# Patient Record
Sex: FEMALE | Race: ASIAN | NOT HISPANIC OR LATINO | Employment: FULL TIME | URBAN - METROPOLITAN AREA
[De-identification: names, ages, dates, MRNs, and addresses within clinical notes are randomized per-mention and may not be internally consistent; named-entity substitution may affect disease eponyms.]

---

## 2020-02-17 ENCOUNTER — TELEPHONE (OUTPATIENT)
Dept: OBSTETRICS AND GYNECOLOGY | Facility: CLINIC | Age: 45
End: 2020-02-17

## 2020-02-17 DIAGNOSIS — N92.3 INTERMENSTRUAL BLEEDING: ICD-10-CM

## 2020-02-17 DIAGNOSIS — Z30.41 ENCOUNTER FOR SURVEILLANCE OF CONTRACEPTIVE PILLS: Primary | ICD-10-CM

## 2020-02-17 NOTE — TELEPHONE ENCOUNTER
Pt c/o BTB with Microgestin.  She has been on this pill for 4 months.  She takes the pill at the same time every day, has not missed a pill and allows monthly periods.  She reports BTB midcycle and also has a period during placebo week.  This is the first pill she has been on recently.      Allergies and pharmacy UTD

## 2020-02-17 NOTE — TELEPHONE ENCOUNTER
Pt states that she experiencing breakthrough bleeding with taking Microgestin. Pt states that she has been taking it for 4 months and wants to see if there is something else she can take.

## 2020-02-18 RX ORDER — NORGESTIMATE AND ETHINYL ESTRADIOL 0.25-0.035
1 KIT ORAL DAILY
Qty: 90 TABLET | Refills: 3 | Status: SHIPPED | OUTPATIENT
Start: 2020-02-18 | End: 2020-11-02

## 2020-02-18 NOTE — TELEPHONE ENCOUNTER
Will change OCP to Ortho-cyclen to see if can control breakthrough bleeding and acne better. Rx sent to pharmacy. Patient needs to schedule followup in 3 months after started new OCP. Would recommend to finish current pill pack before switching to new one.

## 2020-02-18 NOTE — TELEPHONE ENCOUNTER
Pt notified; has 2 weeks left of current pills; offered to schedule appt now but declined; pt stated she will call back to schedule f/u appt for 3 months.

## 2020-07-29 ENCOUNTER — OFFICE VISIT (OUTPATIENT)
Dept: OPTOMETRY | Facility: CLINIC | Age: 45
End: 2020-07-29
Payer: COMMERCIAL

## 2020-07-29 DIAGNOSIS — H52.13 MYOPIA WITH PRESBYOPIA OF BOTH EYES: Primary | ICD-10-CM

## 2020-07-29 DIAGNOSIS — H52.4 MYOPIA WITH PRESBYOPIA OF BOTH EYES: Primary | ICD-10-CM

## 2020-07-29 PROCEDURE — 99999 PR PBB SHADOW E&M-EST. PATIENT-LVL II: ICD-10-PCS | Mod: PBBFAC,,, | Performed by: OPTOMETRIST

## 2020-07-29 PROCEDURE — 92004 PR EYE EXAM, NEW PATIENT,COMPREHESV: ICD-10-PCS | Mod: S$GLB,,, | Performed by: OPTOMETRIST

## 2020-07-29 PROCEDURE — 92004 COMPRE OPH EXAM NEW PT 1/>: CPT | Mod: S$GLB,,, | Performed by: OPTOMETRIST

## 2020-07-29 PROCEDURE — 92015 PR REFRACTION: ICD-10-PCS | Mod: S$GLB,,, | Performed by: OPTOMETRIST

## 2020-07-29 PROCEDURE — 99999 PR PBB SHADOW E&M-EST. PATIENT-LVL II: CPT | Mod: PBBFAC,,, | Performed by: OPTOMETRIST

## 2020-07-29 PROCEDURE — 92015 DETERMINE REFRACTIVE STATE: CPT | Mod: S$GLB,,, | Performed by: OPTOMETRIST

## 2020-07-29 NOTE — PROGRESS NOTES
HPI     WILBER: 2019  Pt states reading is getting a little worse with her glasses on. Dist is   still good   No f/f  No gtts  No sx      Last edited by Mattie Simmons MA on 7/29/2020  3:28 PM. (History)        ROS     Negative for: Constitutional, Gastrointestinal, Neurological, Skin,   Genitourinary, Musculoskeletal, HENT, Endocrine, Cardiovascular, Eyes,   Respiratory, Psychiatric, Allergic/Imm, Heme/Lymph    Last edited by Dakota Coulter, OD on 7/29/2020  3:36 PM. (History)        Assessment /Plan     For exam results, see Encounter Report.    Myopia with presbyopia of both eyes      Inc presby--wrote new spex Rx    PLAN:    rtc 1 yr

## 2020-11-02 ENCOUNTER — OFFICE VISIT (OUTPATIENT)
Dept: OBSTETRICS AND GYNECOLOGY | Facility: CLINIC | Age: 45
End: 2020-11-02
Payer: COMMERCIAL

## 2020-11-02 VITALS
BODY MASS INDEX: 18.93 KG/M2 | DIASTOLIC BLOOD PRESSURE: 85 MMHG | SYSTOLIC BLOOD PRESSURE: 110 MMHG | HEIGHT: 67 IN | WEIGHT: 120.63 LBS

## 2020-11-02 DIAGNOSIS — Z01.419 ENCOUNTER FOR ANNUAL ROUTINE GYNECOLOGICAL EXAMINATION: Primary | ICD-10-CM

## 2020-11-02 DIAGNOSIS — Z30.41 ENCOUNTER FOR SURVEILLANCE OF CONTRACEPTIVE PILLS: ICD-10-CM

## 2020-11-02 DIAGNOSIS — Z11.51 ENCOUNTER FOR SCREENING FOR HUMAN PAPILLOMAVIRUS (HPV): ICD-10-CM

## 2020-11-02 DIAGNOSIS — Z00.00 HEALTH CARE MAINTENANCE: ICD-10-CM

## 2020-11-02 DIAGNOSIS — Z12.4 ENCOUNTER FOR PAPANICOLAOU SMEAR FOR CERVICAL CANCER SCREENING: ICD-10-CM

## 2020-11-02 DIAGNOSIS — Z12.31 ENCOUNTER FOR SCREENING MAMMOGRAM FOR BREAST CANCER: ICD-10-CM

## 2020-11-02 DIAGNOSIS — Z80.41 FAMILY HISTORY OF OVARIAN CANCER: ICD-10-CM

## 2020-11-02 PROCEDURE — 3008F BODY MASS INDEX DOCD: CPT | Mod: CPTII,S$GLB,, | Performed by: OBSTETRICS & GYNECOLOGY

## 2020-11-02 PROCEDURE — 88175 CYTOPATH C/V AUTO FLUID REDO: CPT

## 2020-11-02 PROCEDURE — 87624 HPV HI-RISK TYP POOLED RSLT: CPT

## 2020-11-02 PROCEDURE — 99396 PREV VISIT EST AGE 40-64: CPT | Mod: S$GLB,,, | Performed by: OBSTETRICS & GYNECOLOGY

## 2020-11-02 PROCEDURE — 99999 PR PBB SHADOW E&M-EST. PATIENT-LVL III: CPT | Mod: PBBFAC,,, | Performed by: OBSTETRICS & GYNECOLOGY

## 2020-11-02 PROCEDURE — 3008F PR BODY MASS INDEX (BMI) DOCUMENTED: ICD-10-PCS | Mod: CPTII,S$GLB,, | Performed by: OBSTETRICS & GYNECOLOGY

## 2020-11-02 PROCEDURE — 99396 PR PREVENTIVE VISIT,EST,40-64: ICD-10-PCS | Mod: S$GLB,,, | Performed by: OBSTETRICS & GYNECOLOGY

## 2020-11-02 PROCEDURE — 99999 PR PBB SHADOW E&M-EST. PATIENT-LVL III: ICD-10-PCS | Mod: PBBFAC,,, | Performed by: OBSTETRICS & GYNECOLOGY

## 2020-11-02 RX ORDER — ACETAMINOPHEN AND CODEINE PHOSPHATE 120; 12 MG/5ML; MG/5ML
1 SOLUTION ORAL DAILY
Qty: 90 TABLET | Refills: 3 | Status: SHIPPED | OUTPATIENT
Start: 2020-11-02 | End: 2021-12-07

## 2020-11-02 NOTE — PROGRESS NOTES
Chief Complaint: Well Woman Exam   Patient known to me.  HPI:      Ricardo Vizcarra is a 45 y.o.  who presents for annual exam. She is currently complaining of breakthrough bleeding on OCP.     She has been on Microgestin  for years and had some episodes of bleeding in week 3 a few months ago. She was switched to Ortho Cyclen but stopped after one month due to headache and still having breakthrough bleeding on week 3. She stayed off for 3 months then restarted Microgestin in past 2 months from refills she already had and says breakthrough only about 1-2 days before placebo pills but still having menstrual headache for one day each cycle. She is also getting more acne with menstrual cycle. She desires to continue a pill for now. She has a history of heavy cycles with small fibroids and declined options outside of pills in the past. Records requested today from [facility].    Ms. Vizcarra is currently sexually active with a single male partner. She declines STD screening today. Patient has regular monthly menses. Patient's last menstrual period was 10/24/2020 (exact date). She is currently using oral contraceptives (estrogen/progesterone) for contraception.    Previous Pap:  no abnormalities (2020) Last done at . No records for review today.  Previous Mammogram: 2019 normal per patient. Last done at . No records for review today.    Gardasil:unsure and will check records     OB History        4    Para   2    Term   1       1    AB   2    Living   1       SAB   2    TAB        Ectopic        Multiple        Live Births   1           Obstetric Comments   Menarche at 13             ROS:     GENERAL: Denies unintentional weight gain or weight loss. Feeling well overall.   SKIN: Denies rash or lesions.   HEENT: Denies headaches, or vision changes.   CARDIOVASCULAR: Denies palpitations or chest pain.   RESPIRATORY: Denies shortness of breath or dyspnea on exertion.  BREASTS: Denies pain, lumps, or nipple  "discharge.   ABDOMEN: Denies abdominal pain, constipation, diarrhea, nausea, vomiting, or change in appetite.   URINARY: Denies frequency, dysuria, hematuria.  NEUROLOGIC: Denies syncope or weakness.   PSYCHIATRIC: Denies depression, anxiety or mood swings.    Physical Exam:      PHYSICAL EXAM:  /85   Ht 5' 6.5" (1.689 m)   Wt 54.7 kg (120 lb 9.6 oz)   LMP 10/24/2020 (Exact Date)   BMI 19.17 kg/m²   Body mass index is 19.17 kg/m².     APPEARANCE: Well nourished, well developed, in no acute distress.  PSYCH: Appropriate mood and affect.  SKIN: No acne or hirsutism  NECK: Neck symmetric without masses or thyromegaly  NODES: No inguinal, axillary, or supraclavicular lymph node enlargement  CHEST: Normal respiratory effort.  ABDOMEN: Soft.  No tenderness or masses.   BREASTS: Symmetrical, no skin changes or visible lesions.  No palpable masses or nipple discharge bilaterally.  PELVIC: Normal external genitalia without lesions.  Normal hair distribution.  Adequate perineal body, normal urethral meatus.  Vagina moist and well rugated without lesions or discharge.  Cervix pink, without lesions, discharge or tenderness.  No significant cystocele or rectocele.  Bimanual exam shows uterus to be normal size (8cm), regular, mobile and nontender.  Adnexa without masses or tenderness.    EXTREMITIES: No edema.    Assessment/Plan:     Encounter for annual routine gynecological examination  Normal exam today. Pap smear with HPV done. Mammogram ordered and screening health lab work as not yet gotten a new PCP and desires to do her own research first before choosing. Counseled on hormonal options with progesterone-only or continuous use combined hormonal contraception due to menstrual headaches and desires to try progesterone pill first. Rx sent to pharmacy. Risks/benefits reviewed.   Encounter for Papanicolaou smear for cervical cancer screening  -     Liquid-Based Pap Smear, Screening    Encounter for screening for human " papillomavirus (HPV)  -     HPV High Risk Genotypes, PCR    Encounter for screening mammogram for breast cancer  -     Mammo Digital Screening Bilat w/ Estuardo; Future; Expected date: 2020    Encounter for surveillance of contraceptive pills  -     norethindrone (ORTHO MICRONOR) 0.35 mg tablet; Take 1 tablet (0.35 mg total) by mouth once daily.  Dispense: 90 tablet; Refill: 3    Family history of ovarian cancer  Counseled on genetic testing as unsure of results of mother's testing who  over 10 years ago. Telegenetics appointment scheduled.    Health care maintenance  -     CBC Auto Differential; Future; Expected date: 2020  -     Comprehensive Metabolic Panel; Future; Expected date: 2020  -     Lipid Panel; Future; Expected date: 2020  -     TSH; Future; Expected date: 2020    RTC 1 year     Counseling:     Patient was counseled today on current ASCCP pap guidelines, the recommendation for yearly pelvic exams, healthy diet and exercise routines, breast self awareness and annual mammograms. She is to see her PCP for other health maintenance.       Use of the [employer] Patient Portal discussed and encouraged during today's visit.       Tabitha Mckenzie MD

## 2020-11-04 ENCOUNTER — TELEPHONE (OUTPATIENT)
Dept: OBSTETRICS AND GYNECOLOGY | Facility: CLINIC | Age: 45
End: 2020-11-04

## 2020-11-11 ENCOUNTER — APPOINTMENT (OUTPATIENT)
Dept: RADIOLOGY | Facility: OTHER | Age: 45
End: 2020-11-11
Attending: OBSTETRICS & GYNECOLOGY
Payer: COMMERCIAL

## 2020-11-11 VITALS — WEIGHT: 120.56 LBS | HEIGHT: 67 IN | BODY MASS INDEX: 18.92 KG/M2

## 2020-11-11 DIAGNOSIS — Z12.31 ENCOUNTER FOR SCREENING MAMMOGRAM FOR BREAST CANCER: ICD-10-CM

## 2020-11-11 LAB
HPV HR 12 DNA SPEC QL NAA+PROBE: NEGATIVE
HPV16 AG SPEC QL: NEGATIVE
HPV18 DNA SPEC QL NAA+PROBE: NEGATIVE

## 2020-11-11 PROCEDURE — 77063 BREAST TOMOSYNTHESIS BI: CPT | Mod: 26,,, | Performed by: RADIOLOGY

## 2020-11-11 PROCEDURE — 77063 MAMMO DIGITAL SCREENING BILAT WITH TOMO: ICD-10-PCS | Mod: 26,,, | Performed by: RADIOLOGY

## 2020-11-11 PROCEDURE — 77067 SCR MAMMO BI INCL CAD: CPT | Mod: TC,PN

## 2020-11-11 PROCEDURE — 77067 SCR MAMMO BI INCL CAD: CPT | Mod: 26,,, | Performed by: RADIOLOGY

## 2020-11-11 PROCEDURE — 77067 MAMMO DIGITAL SCREENING BILAT WITH TOMO: ICD-10-PCS | Mod: 26,,, | Performed by: RADIOLOGY

## 2020-12-07 LAB
FINAL PATHOLOGIC DIAGNOSIS: NORMAL
Lab: NORMAL

## 2021-10-12 ENCOUNTER — TELEPHONE (OUTPATIENT)
Dept: OBSTETRICS AND GYNECOLOGY | Facility: CLINIC | Age: 46
End: 2021-10-12

## 2021-10-12 DIAGNOSIS — D25.9 UTERINE LEIOMYOMA, UNSPECIFIED LOCATION: ICD-10-CM

## 2021-10-12 DIAGNOSIS — Z80.41 FAMILY HISTORY OF OVARIAN CANCER: Primary | ICD-10-CM

## 2021-10-15 ENCOUNTER — PATIENT MESSAGE (OUTPATIENT)
Dept: OBSTETRICS AND GYNECOLOGY | Facility: CLINIC | Age: 46
End: 2021-10-15
Payer: COMMERCIAL

## 2021-12-07 ENCOUNTER — OFFICE VISIT (OUTPATIENT)
Dept: OBSTETRICS AND GYNECOLOGY | Facility: CLINIC | Age: 46
End: 2021-12-07
Payer: COMMERCIAL

## 2021-12-07 VITALS — HEIGHT: 65 IN | WEIGHT: 132.06 LBS | BODY MASS INDEX: 22 KG/M2

## 2021-12-07 DIAGNOSIS — Z01.419 ENCOUNTER FOR ANNUAL ROUTINE GYNECOLOGICAL EXAMINATION: Primary | ICD-10-CM

## 2021-12-07 DIAGNOSIS — Z12.31 SCREENING MAMMOGRAM FOR BREAST CANCER: ICD-10-CM

## 2021-12-07 DIAGNOSIS — D25.9 UTERINE LEIOMYOMA, UNSPECIFIED LOCATION: ICD-10-CM

## 2021-12-07 DIAGNOSIS — Z80.41 FAMILY HISTORY OF OVARIAN CANCER: ICD-10-CM

## 2021-12-07 DIAGNOSIS — N94.6 DYSMENORRHEA: ICD-10-CM

## 2021-12-07 PROCEDURE — 99396 PREV VISIT EST AGE 40-64: CPT | Mod: S$GLB,,, | Performed by: OBSTETRICS & GYNECOLOGY

## 2021-12-07 PROCEDURE — 99396 PR PREVENTIVE VISIT,EST,40-64: ICD-10-PCS | Mod: S$GLB,,, | Performed by: OBSTETRICS & GYNECOLOGY

## 2021-12-07 PROCEDURE — 99999 PR PBB SHADOW E&M-EST. PATIENT-LVL III: CPT | Mod: PBBFAC,,, | Performed by: OBSTETRICS & GYNECOLOGY

## 2021-12-07 PROCEDURE — 99999 PR PBB SHADOW E&M-EST. PATIENT-LVL III: ICD-10-PCS | Mod: PBBFAC,,, | Performed by: OBSTETRICS & GYNECOLOGY

## 2021-12-07 RX ORDER — NAPROXEN 500 MG/1
500 TABLET ORAL 2 TIMES DAILY WITH MEALS
Qty: 60 TABLET | Refills: 2 | Status: SHIPPED | OUTPATIENT
Start: 2021-12-07 | End: 2022-11-15 | Stop reason: SDUPTHER

## 2021-12-08 ENCOUNTER — TELEPHONE (OUTPATIENT)
Dept: OBSTETRICS AND GYNECOLOGY | Facility: CLINIC | Age: 46
End: 2021-12-08
Payer: COMMERCIAL

## 2021-12-16 ENCOUNTER — APPOINTMENT (OUTPATIENT)
Dept: RADIOLOGY | Facility: OTHER | Age: 46
End: 2021-12-16
Attending: OBSTETRICS & GYNECOLOGY
Payer: COMMERCIAL

## 2021-12-16 VITALS — WEIGHT: 132.06 LBS | HEIGHT: 65 IN | BODY MASS INDEX: 22 KG/M2

## 2021-12-16 DIAGNOSIS — Z12.31 SCREENING MAMMOGRAM FOR BREAST CANCER: ICD-10-CM

## 2021-12-16 PROCEDURE — 77063 BREAST TOMOSYNTHESIS BI: CPT | Mod: 26,,, | Performed by: RADIOLOGY

## 2021-12-16 PROCEDURE — 77067 SCR MAMMO BI INCL CAD: CPT | Mod: 26,,, | Performed by: RADIOLOGY

## 2021-12-16 PROCEDURE — 77067 MAMMO DIGITAL SCREENING BILAT WITH TOMO: ICD-10-PCS | Mod: 26,,, | Performed by: RADIOLOGY

## 2021-12-16 PROCEDURE — 77067 SCR MAMMO BI INCL CAD: CPT | Mod: TC,PN

## 2021-12-16 PROCEDURE — 77063 MAMMO DIGITAL SCREENING BILAT WITH TOMO: ICD-10-PCS | Mod: 26,,, | Performed by: RADIOLOGY

## 2022-03-22 ENCOUNTER — TELEPHONE (OUTPATIENT)
Dept: INTERNAL MEDICINE | Facility: CLINIC | Age: 47
End: 2022-03-22
Payer: COMMERCIAL

## 2022-03-22 NOTE — TELEPHONE ENCOUNTER
----- Message from Val Diaz sent at 3/22/2022 10:53 AM CDT -----  .Type:  Patient Call Back    Who Called: pt       Does the patient know what this is regarding?: Pt received a message about coming in sooner I only see the 4/20/2022    Would the patient rather a call back yes     Best Call Back Number: [phone]    Additional Information: Thank You

## 2022-04-20 ENCOUNTER — OFFICE VISIT (OUTPATIENT)
Dept: INTERNAL MEDICINE | Facility: CLINIC | Age: 47
End: 2022-04-20
Payer: COMMERCIAL

## 2022-04-20 VITALS
BODY MASS INDEX: 18.17 KG/M2 | SYSTOLIC BLOOD PRESSURE: 112 MMHG | TEMPERATURE: 99 F | HEART RATE: 84 BPM | DIASTOLIC BLOOD PRESSURE: 62 MMHG | HEIGHT: 67 IN | WEIGHT: 115.75 LBS | OXYGEN SATURATION: 99 %

## 2022-04-20 DIAGNOSIS — Z12.11 SCREENING FOR COLORECTAL CANCER: ICD-10-CM

## 2022-04-20 DIAGNOSIS — Z11.59 ENCOUNTER FOR HEPATITIS C SCREENING TEST FOR LOW RISK PATIENT: ICD-10-CM

## 2022-04-20 DIAGNOSIS — Z00.00 WELLNESS EXAMINATION: Primary | ICD-10-CM

## 2022-04-20 DIAGNOSIS — Z12.12 SCREENING FOR COLORECTAL CANCER: ICD-10-CM

## 2022-04-20 PROCEDURE — 3008F PR BODY MASS INDEX (BMI) DOCUMENTED: ICD-10-PCS | Mod: CPTII,S$GLB,, | Performed by: INTERNAL MEDICINE

## 2022-04-20 PROCEDURE — 99386 PREV VISIT NEW AGE 40-64: CPT | Mod: S$GLB,,, | Performed by: INTERNAL MEDICINE

## 2022-04-20 PROCEDURE — 3078F PR MOST RECENT DIASTOLIC BLOOD PRESSURE < 80 MM HG: ICD-10-PCS | Mod: CPTII,S$GLB,, | Performed by: INTERNAL MEDICINE

## 2022-04-20 PROCEDURE — 1159F PR MEDICATION LIST DOCUMENTED IN MEDICAL RECORD: ICD-10-PCS | Mod: CPTII,S$GLB,, | Performed by: INTERNAL MEDICINE

## 2022-04-20 PROCEDURE — 3008F BODY MASS INDEX DOCD: CPT | Mod: CPTII,S$GLB,, | Performed by: INTERNAL MEDICINE

## 2022-04-20 PROCEDURE — 99386 PR PREVENTIVE VISIT,NEW,40-64: ICD-10-PCS | Mod: S$GLB,,, | Performed by: INTERNAL MEDICINE

## 2022-04-20 PROCEDURE — 1159F MED LIST DOCD IN RCRD: CPT | Mod: CPTII,S$GLB,, | Performed by: INTERNAL MEDICINE

## 2022-04-20 PROCEDURE — 3078F DIAST BP <80 MM HG: CPT | Mod: CPTII,S$GLB,, | Performed by: INTERNAL MEDICINE

## 2022-04-20 PROCEDURE — 99999 PR PBB SHADOW E&M-EST. PATIENT-LVL IV: CPT | Mod: PBBFAC,,, | Performed by: INTERNAL MEDICINE

## 2022-04-20 PROCEDURE — 3074F PR MOST RECENT SYSTOLIC BLOOD PRESSURE < 130 MM HG: ICD-10-PCS | Mod: CPTII,S$GLB,, | Performed by: INTERNAL MEDICINE

## 2022-04-20 PROCEDURE — 3074F SYST BP LT 130 MM HG: CPT | Mod: CPTII,S$GLB,, | Performed by: INTERNAL MEDICINE

## 2022-04-20 PROCEDURE — 99999 PR PBB SHADOW E&M-EST. PATIENT-LVL IV: ICD-10-PCS | Mod: PBBFAC,,, | Performed by: INTERNAL MEDICINE

## 2022-04-20 NOTE — PROGRESS NOTES
[facility] Note    Chief Complaint      Chief Complaint   Patient presents with    [facility]    Annual Exam     History of Present Illness      Ricardo Vizcarra is a 47 y.o. female who presents today for chief complaint annual wellness and est care. Patient is new to me.    PCP: Ashley Lind MD  Patient comes to appointment alone.     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    ref'd'd here by Dr Mckenzie, had labs in Nov 2020, WWE 12.21, last pap 11.20  mmg 12.21  Colorectal cancer screening; no fam hx no melena pr brbpr   Has 2 kids 17& 12, they ho to du   Mood is ok, sleep is ok, does exercie, swims twice weekly at [employer], not really watching diet    declines flu shot today   There are no problems to display for this patient.      Health Maintenance   Topic Date Due    Hepatitis C Screening  Never done    TETANUS VACCINE  Never done    Mammogram  12/16/2022    Lipid Panel  11/11/2025       History reviewed. No pertinent past medical history.    Past Surgical History:   Procedure Laterality Date    BREAST CYST EXCISION Left        family history includes Hypertension in her father and mother; Ovarian cancer (age of onset: 66) in her mother.    Social History     Tobacco Use    Smoking status: Never Smoker    Smokeless tobacco: Never Used   Substance Use Topics    Alcohol use: Not Currently    Drug use: Never       Review of Systems   Constitutional: Negative for chills and fever.   HENT: Negative for congestion and sore throat.    Eyes: Negative for blurred vision and discharge.   Respiratory: Negative for cough and shortness of breath.    Cardiovascular: Negative for chest pain and palpitations.   Gastrointestinal: Negative for constipation, diarrhea, nausea and vomiting.   Genitourinary: Negative for dysuria and hematuria.   Musculoskeletal: Negative for falls and myalgias.   Skin: Negative for itching and rash.   Neurological: Negative for  "dizziness and headaches.        Outpatient Encounter Medications as of 4/20/2022   Medication Sig Dispense Refill    naproxen (NAPROSYN) 500 MG tablet Take 1 tablet (500 mg total) by mouth 2 (two) times daily with meals. 60 tablet 2     No facility-administered encounter medications on file as of 4/20/2022.        Review of patient's allergies indicates:   Allergen Reactions    Latex, natural rubber Rash           Physical Exam      Vital Signs  Temp: 99 °F (37.2 °C)  Temp src: Oral  Pulse: 84  SpO2: 99 %  BP: 112/62  BP Location: Right arm  Patient Position: Sitting  Pain Score: 0-No pain  Height and Weight  Height: 5' 6.5" (168.9 cm)  Weight: 52.5 kg (115 lb 11.9 oz)  BSA (Calculated - sq m): 1.57 sq meters  BMI (Calculated): 18.4  Weight in (lb) to have BMI = 25: 156.9]    Physical Exam  Vitals reviewed.   Constitutional:       Appearance: She is well-developed.   HENT:      Head: Normocephalic and atraumatic.      Right Ear: External ear normal.      Left Ear: External ear normal.   Eyes:      General:         Right eye: No discharge.         Left eye: No discharge.   Neck:      Thyroid: No thyromegaly.   Cardiovascular:      Rate and Rhythm: Normal rate and regular rhythm.      Heart sounds: Normal heart sounds. No murmur heard.  Pulmonary:      Effort: Pulmonary effort is normal. No respiratory distress.      Breath sounds: Normal breath sounds.   Abdominal:      General: Bowel sounds are normal. There is no distension.      Palpations: Abdomen is soft.      Tenderness: There is no abdominal tenderness.   Musculoskeletal:         General: No deformity. Normal range of motion.      Cervical back: Normal range of motion.   Skin:     General: Skin is warm and dry.      Findings: No rash.   Neurological:      Mental Status: She is alert and oriented to person, place, and time.   Psychiatric:         Behavior: Behavior normal.       Laboratory & Radiology:  No recent for review     Assessment/Plan     Ricardo Vizcarra " is a 47 y.o.female with:  Wellness exam   Annual labs, form completed, cologuard ordered    No problem-specific Assessment & Plan notes found for this encounter.      Orders Placed This Encounter   Procedures    Cologuard Screening (Multitarget Stool DNA)     Standing Status:   Future     Number of Occurrences:   1     Standing Expiration Date:   6/19/2023    Lipid Panel     Standing Status:   Future     Standing Expiration Date:   6/19/2023    Comprehensive Metabolic Panel     Standing Status:   Future     Standing Expiration Date:   6/19/2023    Hepatitis C Antibody     Standing Status:   Future     Standing Expiration Date:   6/19/2023     Order Specific Question:   Release to patient     Answer:   Immediate    CBC Without Differential     Standing Status:   Future     Standing Expiration Date:   6/19/2023       Use of the [employer] Patient Portal discussed and encouraged during today's visit  -Continue current medications and maintain follow up with specialists.  Return to clinic in 12 months.  Future Appointments   Date Time Provider Department Center   4/22/2022  8:00 AM LAB, [facility]       Ashley Lind MD  4/20/2022 9:56 AM    Primary Care Internal Medicine

## 2022-04-22 ENCOUNTER — LAB VISIT (OUTPATIENT)
Dept: LAB | Facility: HOSPITAL | Age: 47
End: 2022-04-22
Attending: INTERNAL MEDICINE
Payer: COMMERCIAL

## 2022-04-22 DIAGNOSIS — Z00.00 WELLNESS EXAMINATION: ICD-10-CM

## 2022-04-22 DIAGNOSIS — Z11.59 ENCOUNTER FOR HEPATITIS C SCREENING TEST FOR LOW RISK PATIENT: ICD-10-CM

## 2022-04-22 LAB
ALBUMIN SERPL BCP-MCNC: 4.2 G/DL (ref 3.5–5.2)
ALP SERPL-CCNC: 46 U/L (ref 55–135)
ALT SERPL W/O P-5'-P-CCNC: 13 U/L (ref 10–44)
ANION GAP SERPL CALC-SCNC: 8 MMOL/L (ref 8–16)
AST SERPL-CCNC: 14 U/L (ref 10–40)
BILIRUB SERPL-MCNC: 0.7 MG/DL (ref 0.1–1)
BUN SERPL-MCNC: 12 MG/DL (ref 6–20)
CALCIUM SERPL-MCNC: 9.3 MG/DL (ref 8.7–10.5)
CHLORIDE SERPL-SCNC: 105 MMOL/L (ref 95–110)
CHOLEST SERPL-MCNC: 158 MG/DL (ref 120–199)
CHOLEST/HDLC SERPL: 2.8 {RATIO} (ref 2–5)
CO2 SERPL-SCNC: 27 MMOL/L (ref 23–29)
CREAT SERPL-MCNC: 0.7 MG/DL (ref 0.5–1.4)
ERYTHROCYTE [DISTWIDTH] IN BLOOD BY AUTOMATED COUNT: 14.6 % (ref 11.5–14.5)
EST. GFR  (AFRICAN AMERICAN): >60 ML/MIN/1.73 M^2
EST. GFR  (NON AFRICAN AMERICAN): >60 ML/MIN/1.73 M^2
GLUCOSE SERPL-MCNC: 97 MG/DL (ref 70–110)
HCT VFR BLD AUTO: 43.8 % (ref 37–48.5)
HDLC SERPL-MCNC: 57 MG/DL (ref 40–75)
HDLC SERPL: 36.1 % (ref 20–50)
HGB BLD-MCNC: 13.1 G/DL (ref 12–16)
LDLC SERPL CALC-MCNC: 86.2 MG/DL (ref 63–159)
MCH RBC QN AUTO: 21.7 PG (ref 27–31)
MCHC RBC AUTO-ENTMCNC: 29.9 G/DL (ref 32–36)
MCV RBC AUTO: 73 FL (ref 82–98)
NONHDLC SERPL-MCNC: 101 MG/DL
PLATELET # BLD AUTO: 189 K/UL (ref 150–450)
PMV BLD AUTO: 11.1 FL (ref 9.2–12.9)
POTASSIUM SERPL-SCNC: 4 MMOL/L (ref 3.5–5.1)
PROT SERPL-MCNC: 7 G/DL (ref 6–8.4)
RBC # BLD AUTO: 6.03 M/UL (ref 4–5.4)
SODIUM SERPL-SCNC: 140 MMOL/L (ref 136–145)
TRIGL SERPL-MCNC: 74 MG/DL (ref 30–150)
WBC # BLD AUTO: 3.84 K/UL (ref 3.9–12.7)

## 2022-04-22 PROCEDURE — 80061 LIPID PANEL: CPT | Performed by: INTERNAL MEDICINE

## 2022-04-22 PROCEDURE — 80053 COMPREHEN METABOLIC PANEL: CPT | Performed by: INTERNAL MEDICINE

## 2022-04-22 PROCEDURE — 86803 HEPATITIS C AB TEST: CPT | Performed by: INTERNAL MEDICINE

## 2022-04-22 PROCEDURE — 85027 COMPLETE CBC AUTOMATED: CPT | Performed by: INTERNAL MEDICINE

## 2022-04-27 LAB — HCV AB SERPL QL IA: NEGATIVE

## 2022-05-02 ENCOUNTER — PATIENT MESSAGE (OUTPATIENT)
Dept: ADMINISTRATIVE | Facility: HOSPITAL | Age: 47
End: 2022-05-02
Payer: COMMERCIAL

## 2022-05-03 ENCOUNTER — PATIENT MESSAGE (OUTPATIENT)
Dept: INTERNAL MEDICINE | Facility: CLINIC | Age: 47
End: 2022-05-03
Payer: COMMERCIAL

## 2022-05-03 DIAGNOSIS — R71.8 MICROCYTOSIS: Primary | ICD-10-CM

## 2022-05-04 LAB — NONINV COLON CA DNA+OCC BLD SCRN STL QL: NEGATIVE

## 2022-07-11 ENCOUNTER — TELEPHONE (OUTPATIENT)
Dept: OBSTETRICS AND GYNECOLOGY | Facility: CLINIC | Age: 47
End: 2022-07-11
Payer: COMMERCIAL

## 2022-07-11 DIAGNOSIS — Z80.41 FAMILY HISTORY OF OVARIAN CANCER: ICD-10-CM

## 2022-07-11 DIAGNOSIS — N83.209 CYST OF OVARY, UNSPECIFIED LATERALITY: Primary | ICD-10-CM

## 2022-11-15 ENCOUNTER — OFFICE VISIT (OUTPATIENT)
Dept: OBSTETRICS AND GYNECOLOGY | Facility: CLINIC | Age: 47
End: 2022-11-15
Payer: COMMERCIAL

## 2022-11-15 ENCOUNTER — TELEPHONE (OUTPATIENT)
Dept: OBSTETRICS AND GYNECOLOGY | Facility: CLINIC | Age: 47
End: 2022-11-15
Payer: COMMERCIAL

## 2022-11-15 VITALS
BODY MASS INDEX: 19.22 KG/M2 | HEIGHT: 66 IN | SYSTOLIC BLOOD PRESSURE: 120 MMHG | DIASTOLIC BLOOD PRESSURE: 88 MMHG | WEIGHT: 119.63 LBS

## 2022-11-15 DIAGNOSIS — Z01.419 ENCOUNTER FOR ANNUAL ROUTINE GYNECOLOGICAL EXAMINATION: Primary | ICD-10-CM

## 2022-11-15 DIAGNOSIS — Z12.31 VISIT FOR SCREENING MAMMOGRAM: ICD-10-CM

## 2022-11-15 DIAGNOSIS — N94.6 DYSMENORRHEA: ICD-10-CM

## 2022-11-15 PROCEDURE — 1160F PR REVIEW ALL MEDS BY PRESCRIBER/CLIN PHARMACIST DOCUMENTED: ICD-10-PCS | Mod: CPTII,S$GLB,, | Performed by: OBSTETRICS & GYNECOLOGY

## 2022-11-15 PROCEDURE — 3079F DIAST BP 80-89 MM HG: CPT | Mod: CPTII,S$GLB,, | Performed by: OBSTETRICS & GYNECOLOGY

## 2022-11-15 PROCEDURE — 3008F BODY MASS INDEX DOCD: CPT | Mod: CPTII,S$GLB,, | Performed by: OBSTETRICS & GYNECOLOGY

## 2022-11-15 PROCEDURE — 99396 PREV VISIT EST AGE 40-64: CPT | Mod: S$GLB,,, | Performed by: OBSTETRICS & GYNECOLOGY

## 2022-11-15 PROCEDURE — 1159F PR MEDICATION LIST DOCUMENTED IN MEDICAL RECORD: ICD-10-PCS | Mod: CPTII,S$GLB,, | Performed by: OBSTETRICS & GYNECOLOGY

## 2022-11-15 PROCEDURE — 3079F PR MOST RECENT DIASTOLIC BLOOD PRESSURE 80-89 MM HG: ICD-10-PCS | Mod: CPTII,S$GLB,, | Performed by: OBSTETRICS & GYNECOLOGY

## 2022-11-15 PROCEDURE — 99396 PR PREVENTIVE VISIT,EST,40-64: ICD-10-PCS | Mod: S$GLB,,, | Performed by: OBSTETRICS & GYNECOLOGY

## 2022-11-15 PROCEDURE — 3008F PR BODY MASS INDEX (BMI) DOCUMENTED: ICD-10-PCS | Mod: CPTII,S$GLB,, | Performed by: OBSTETRICS & GYNECOLOGY

## 2022-11-15 PROCEDURE — 3074F SYST BP LT 130 MM HG: CPT | Mod: CPTII,S$GLB,, | Performed by: OBSTETRICS & GYNECOLOGY

## 2022-11-15 PROCEDURE — 99999 PR PBB SHADOW E&M-EST. PATIENT-LVL III: CPT | Mod: PBBFAC,,, | Performed by: OBSTETRICS & GYNECOLOGY

## 2022-11-15 PROCEDURE — 99999 PR PBB SHADOW E&M-EST. PATIENT-LVL III: ICD-10-PCS | Mod: PBBFAC,,, | Performed by: OBSTETRICS & GYNECOLOGY

## 2022-11-15 PROCEDURE — 1160F RVW MEDS BY RX/DR IN RCRD: CPT | Mod: CPTII,S$GLB,, | Performed by: OBSTETRICS & GYNECOLOGY

## 2022-11-15 PROCEDURE — 3074F PR MOST RECENT SYSTOLIC BLOOD PRESSURE < 130 MM HG: ICD-10-PCS | Mod: CPTII,S$GLB,, | Performed by: OBSTETRICS & GYNECOLOGY

## 2022-11-15 PROCEDURE — 1159F MED LIST DOCD IN RCRD: CPT | Mod: CPTII,S$GLB,, | Performed by: OBSTETRICS & GYNECOLOGY

## 2022-11-15 RX ORDER — NAPROXEN 500 MG/1
500 TABLET ORAL 2 TIMES DAILY WITH MEALS
Qty: 60 TABLET | Refills: 2 | Status: SHIPPED | OUTPATIENT
Start: 2022-11-15 | End: 2023-11-15

## 2022-11-25 NOTE — TELEPHONE ENCOUNTER
Message from 10.3 still not read  Can we please call her back, I'd like to do follow up labs on her. Orders are in from 10.2  Thanks  Ashley Lind MD  11/25/2022 7:41 AM

## 2022-11-28 ENCOUNTER — LAB VISIT (OUTPATIENT)
Dept: LAB | Facility: HOSPITAL | Age: 47
End: 2022-11-28
Attending: INTERNAL MEDICINE
Payer: COMMERCIAL

## 2022-11-28 DIAGNOSIS — R71.8 MICROCYTOSIS: ICD-10-CM

## 2022-11-28 LAB
BASOPHILS # BLD AUTO: 0.01 K/UL (ref 0–0.2)
BASOPHILS NFR BLD: 0.3 % (ref 0–1.9)
DIFFERENTIAL METHOD: ABNORMAL
EOSINOPHIL # BLD AUTO: 0.1 K/UL (ref 0–0.5)
EOSINOPHIL NFR BLD: 2.4 % (ref 0–8)
ERYTHROCYTE [DISTWIDTH] IN BLOOD BY AUTOMATED COUNT: 15 % (ref 11.5–14.5)
FERRITIN SERPL-MCNC: 26 NG/ML (ref 20–300)
HCT VFR BLD AUTO: 42.1 % (ref 37–48.5)
HGB BLD-MCNC: 12.1 G/DL (ref 12–16)
IMM GRANULOCYTES # BLD AUTO: 0.01 K/UL (ref 0–0.04)
IMM GRANULOCYTES NFR BLD AUTO: 0.3 % (ref 0–0.5)
IRON SERPL-MCNC: 96 UG/DL (ref 30–160)
LYMPHOCYTES # BLD AUTO: 0.8 K/UL (ref 1–4.8)
LYMPHOCYTES NFR BLD: 26.2 % (ref 18–48)
MCH RBC QN AUTO: 21.1 PG (ref 27–31)
MCHC RBC AUTO-ENTMCNC: 28.7 G/DL (ref 32–36)
MCV RBC AUTO: 73 FL (ref 82–98)
MONOCYTES # BLD AUTO: 0.2 K/UL (ref 0.3–1)
MONOCYTES NFR BLD: 6.9 % (ref 4–15)
NEUTROPHILS # BLD AUTO: 1.9 K/UL (ref 1.8–7.7)
NEUTROPHILS NFR BLD: 63.9 % (ref 38–73)
NRBC BLD-RTO: 0 /100 WBC
PATH REV BLD -IMP: NORMAL
PLATELET # BLD AUTO: 221 K/UL (ref 150–450)
PMV BLD AUTO: 11.3 FL (ref 9.2–12.9)
RBC # BLD AUTO: 5.74 M/UL (ref 4–5.4)
SATURATED IRON: 27 % (ref 20–50)
TOTAL IRON BINDING CAPACITY: 355 UG/DL (ref 250–450)
TRANSFERRIN SERPL-MCNC: 240 MG/DL (ref 200–375)
WBC # BLD AUTO: 2.9 K/UL (ref 3.9–12.7)

## 2022-11-28 PROCEDURE — 85025 COMPLETE CBC W/AUTO DIFF WBC: CPT | Performed by: INTERNAL MEDICINE

## 2022-11-28 PROCEDURE — 85060 PATHOLOGIST REVIEW: ICD-10-PCS | Mod: ,,, | Performed by: PATHOLOGY

## 2022-11-28 PROCEDURE — 85060 BLOOD SMEAR INTERPRETATION: CPT | Mod: ,,, | Performed by: PATHOLOGY

## 2022-11-28 PROCEDURE — 82728 ASSAY OF FERRITIN: CPT | Performed by: INTERNAL MEDICINE

## 2022-11-28 PROCEDURE — 84466 ASSAY OF TRANSFERRIN: CPT | Performed by: INTERNAL MEDICINE

## 2022-11-29 LAB — PATH REV BLD -IMP: NORMAL

## 2022-12-30 ENCOUNTER — PATIENT MESSAGE (OUTPATIENT)
Dept: INTERNAL MEDICINE | Facility: CLINIC | Age: 47
End: 2022-12-30
Payer: COMMERCIAL

## 2022-12-30 DIAGNOSIS — R71.8 MICROCYTOSIS: Primary | ICD-10-CM

## 2022-12-30 NOTE — TELEPHONE ENCOUNTER
Please call pt and let her know I looked at her follow up labs, she may have some iron deficiency   I am going to send her to Hematology for further eval

## 2023-01-20 ENCOUNTER — LAB VISIT (OUTPATIENT)
Dept: LAB | Facility: HOSPITAL | Age: 48
End: 2023-01-20
Payer: COMMERCIAL

## 2023-01-20 ENCOUNTER — OFFICE VISIT (OUTPATIENT)
Dept: HEMATOLOGY/ONCOLOGY | Facility: CLINIC | Age: 48
End: 2023-01-20
Payer: COMMERCIAL

## 2023-01-20 VITALS
HEART RATE: 87 BPM | TEMPERATURE: 98 F | RESPIRATION RATE: 16 BRPM | OXYGEN SATURATION: 99 % | BODY MASS INDEX: 18.62 KG/M2 | DIASTOLIC BLOOD PRESSURE: 73 MMHG | WEIGHT: 118.63 LBS | HEIGHT: 67 IN | SYSTOLIC BLOOD PRESSURE: 117 MMHG

## 2023-01-20 DIAGNOSIS — R71.8 MICROCYTOSIS: ICD-10-CM

## 2023-01-20 DIAGNOSIS — D56.3 ALPHA THALASSEMIA TRAIT: ICD-10-CM

## 2023-01-20 DIAGNOSIS — R71.8 MICROCYTOSIS: Primary | ICD-10-CM

## 2023-01-20 LAB
BASOPHILS # BLD AUTO: 0.01 K/UL (ref 0–0.2)
BASOPHILS NFR BLD: 0.3 % (ref 0–1.9)
DIFFERENTIAL METHOD: ABNORMAL
EOSINOPHIL # BLD AUTO: 0.1 K/UL (ref 0–0.5)
EOSINOPHIL NFR BLD: 1.7 % (ref 0–8)
ERYTHROCYTE [DISTWIDTH] IN BLOOD BY AUTOMATED COUNT: 15.2 % (ref 11.5–14.5)
HCT VFR BLD AUTO: 39.1 % (ref 37–48.5)
HGB BLD-MCNC: 11.4 G/DL (ref 12–16)
IMM GRANULOCYTES # BLD AUTO: 0.01 K/UL (ref 0–0.04)
IMM GRANULOCYTES NFR BLD AUTO: 0.3 % (ref 0–0.5)
LYMPHOCYTES # BLD AUTO: 0.7 K/UL (ref 1–4.8)
LYMPHOCYTES NFR BLD: 19.1 % (ref 18–48)
MCH RBC QN AUTO: 21.1 PG (ref 27–31)
MCHC RBC AUTO-ENTMCNC: 29.2 G/DL (ref 32–36)
MCV RBC AUTO: 72 FL (ref 82–98)
MONOCYTES # BLD AUTO: 0.3 K/UL (ref 0.3–1)
MONOCYTES NFR BLD: 8.6 % (ref 4–15)
NEUTROPHILS # BLD AUTO: 2.5 K/UL (ref 1.8–7.7)
NEUTROPHILS NFR BLD: 70 % (ref 38–73)
NRBC BLD-RTO: 0 /100 WBC
PLATELET # BLD AUTO: 206 K/UL (ref 150–450)
PMV BLD AUTO: 11.4 FL (ref 9.2–12.9)
RBC # BLD AUTO: 5.41 M/UL (ref 4–5.4)
WBC # BLD AUTO: 3.62 K/UL (ref 3.9–12.7)

## 2023-01-20 PROCEDURE — 85025 COMPLETE CBC W/AUTO DIFF WBC: CPT | Performed by: INTERNAL MEDICINE

## 2023-01-20 PROCEDURE — 3008F BODY MASS INDEX DOCD: CPT | Mod: CPTII,S$GLB,, | Performed by: INTERNAL MEDICINE

## 2023-01-20 PROCEDURE — 83020 HEMOGLOBIN ELECTROPHORESIS: CPT | Performed by: INTERNAL MEDICINE

## 2023-01-20 PROCEDURE — 3074F PR MOST RECENT SYSTOLIC BLOOD PRESSURE < 130 MM HG: ICD-10-PCS | Mod: CPTII,S$GLB,, | Performed by: INTERNAL MEDICINE

## 2023-01-20 PROCEDURE — 3008F PR BODY MASS INDEX (BMI) DOCUMENTED: ICD-10-PCS | Mod: CPTII,S$GLB,, | Performed by: INTERNAL MEDICINE

## 2023-01-20 PROCEDURE — 99203 PR OFFICE/OUTPT VISIT, NEW, LEVL III, 30-44 MIN: ICD-10-PCS | Mod: S$GLB,,, | Performed by: INTERNAL MEDICINE

## 2023-01-20 PROCEDURE — 99999 PR PBB SHADOW E&M-EST. PATIENT-LVL IV: ICD-10-PCS | Mod: PBBFAC,,, | Performed by: INTERNAL MEDICINE

## 2023-01-20 PROCEDURE — 3078F DIAST BP <80 MM HG: CPT | Mod: CPTII,S$GLB,, | Performed by: INTERNAL MEDICINE

## 2023-01-20 PROCEDURE — 82652 VIT D 1 25-DIHYDROXY: CPT | Performed by: INTERNAL MEDICINE

## 2023-01-20 PROCEDURE — 99999 PR PBB SHADOW E&M-EST. PATIENT-LVL IV: CPT | Mod: PBBFAC,,, | Performed by: INTERNAL MEDICINE

## 2023-01-20 PROCEDURE — 3078F PR MOST RECENT DIASTOLIC BLOOD PRESSURE < 80 MM HG: ICD-10-PCS | Mod: CPTII,S$GLB,, | Performed by: INTERNAL MEDICINE

## 2023-01-20 PROCEDURE — 36415 COLL VENOUS BLD VENIPUNCTURE: CPT | Performed by: INTERNAL MEDICINE

## 2023-01-20 PROCEDURE — 3074F SYST BP LT 130 MM HG: CPT | Mod: CPTII,S$GLB,, | Performed by: INTERNAL MEDICINE

## 2023-01-20 PROCEDURE — 99203 OFFICE O/P NEW LOW 30 MIN: CPT | Mod: S$GLB,,, | Performed by: INTERNAL MEDICINE

## 2023-01-20 NOTE — PROGRESS NOTES
Hematology and Medical Oncology   New Patient Consult     01/20/2023  Referred by:  Dr. Ashley Lind    Reason For Referral: Microcytic Anemia      History of Present Ilness:   Ricardo Sosa) is a pleasant 47 y.o.female who presents to clinic to discuss her long standing microcytosis in the low 70's range. Feels like she is in her normal state of health.     Denies unusual bleeding or heavy cycles.    On chart review dating back to 2007 hemoglobin has consistently been in the 9-12 range with an MCV of 68-74.    Pathologist Interpretation:  PATHOLOGIST INTERPRETATION   RBC- No significant morphologic abnormalities.   WBC- Normal morphology.  No dysplasia or circulating blasts.   PLT- Normal in number and morphology. No clumping.       PAST MEDICAL HISTORY:   No past medical history on file.    PAST SURGICAL HISTORY:   Past Surgical History:   Procedure Laterality Date    BREAST CYST EXCISION Left        PAST SOCIAL HISTORY:   reports that she has never smoked. She has never used smokeless tobacco. She reports that she does not currently use alcohol. She reports that she does not use drugs.    FAMILY HISTORY:  Family History   Problem Relation Age of Onset    Hypertension Mother     Ovarian cancer Mother 66    Hypertension Father     Breast cancer Neg Hx        CURRENT MEDICATIONS:   Current Outpatient Medications   Medication Sig    naproxen (NAPROSYN) 500 MG tablet Take 1 tablet (500 mg total) by mouth 2 (two) times daily with meals.     No current facility-administered medications for this visit.     ALLERGIES:   Review of patient's allergies indicates:   Allergen Reactions    Latex, natural rubber Rash         Review of Systems:     Review of Systems   Constitutional:  Negative for appetite change and unexpected weight change.   HENT:   Negative for mouth sores.    Respiratory:  Negative for cough and shortness of breath.    Cardiovascular:  Negative for chest pain.   Gastrointestinal:  Negative for  abdominal pain and diarrhea.   Genitourinary:  Negative for frequency.    Musculoskeletal:  Negative for back pain.   Skin:  Negative for rash.   Neurological:  Negative for headaches.   Hematological:  Negative for adenopathy.   Psychiatric/Behavioral:  The patient is not nervous/anxious.         Physical Exam:     Vitals:    01/20/23 0821   BP: 117/73   Pulse: 87   Resp: 16   Temp: 97.9 °F (36.6 °C)     Physical Exam  Constitutional:       General: She is not in acute distress.     Appearance: She is well-developed. She is not diaphoretic.   HENT:      Head: Normocephalic and atraumatic.      Mouth/Throat:      Pharynx: No oropharyngeal exudate.   Eyes:      Conjunctiva/sclera: Conjunctivae normal.      Pupils: Pupils are equal, round, and reactive to light.   Neck:      Thyroid: No thyromegaly.      Vascular: No JVD.      Trachea: No tracheal deviation.   Cardiovascular:      Rate and Rhythm: Normal rate and regular rhythm.      Heart sounds: Normal heart sounds. No murmur heard.    No friction rub.   Pulmonary:      Effort: Pulmonary effort is normal. No respiratory distress.      Breath sounds: Normal breath sounds. No stridor. No wheezing or rales.   Chest:      Chest wall: No tenderness.   Abdominal:      General: Bowel sounds are normal. There is no distension.      Palpations: Abdomen is soft.      Tenderness: There is no abdominal tenderness. There is no guarding or rebound.   Musculoskeletal:         General: No tenderness or deformity. Normal range of motion.      Cervical back: Normal range of motion and neck supple.   Skin:     General: Skin is warm and dry.      Capillary Refill: Capillary refill takes less than 2 seconds.      Coloration: Skin is not pale.      Findings: No erythema or rash.   Neurological:      Mental Status: She is alert and oriented to person, place, and time.      Cranial Nerves: No cranial nerve deficit.      Sensory: No sensory deficit.      Motor: No abnormal muscle tone.       Coordination: Coordination normal.      Deep Tendon Reflexes: Reflexes normal.   Psychiatric:         Behavior: Behavior normal.         Thought Content: Thought content normal.         Judgment: Judgment normal.       ECOG Performance Status: (foot note - ECOG PS provided by Eastern Cooperative Oncology Group) 0 - Asymptomatic    Karnofsky Performance Score:  100%- Normal, No Complaints, No Evidence of Disease    Labs:   Lab Results   Component Value Date    WBC 3.62 (L) 01/20/2023    HGB 11.4 (L) 01/20/2023    HCT 39.1 01/20/2023     01/20/2023    CHOL 158 04/22/2022    TRIG 74 04/22/2022    HDL 57 04/22/2022    ALT 13 04/22/2022    AST 14 04/22/2022     04/22/2022    K 4.0 04/22/2022     04/22/2022    CREATININE 0.7 04/22/2022    BUN 12 04/22/2022    CO2 27 04/22/2022    TSH 1.120 11/11/2020     Hemoglobin Electrophoresis:  No abnormal hemoglobins are detected.   Review of lab data reveals persistent microcytosis with elevated RBC   count and normal iron stores.     This pattern is consistent with alpha thalassemia trait. Recommend   alpha globin gene analysis if clinically indicated.     Imaging: Previous imaging has been reviewed     Assessment and Plan:     Ricardo was seen today for microcytosis     Diagnoses and all orders for this visit:    Alpha Thalassemia Trait  --Identified by hemoglobin electrophoresis  --No indication or need for therapy  --MCV will consistently remain at about 70  --Labs have been stable over many years  --Hemoglobin less than 10 was at the time of pregnancy        35 minutes were spent face to face with the patient and her  family to discuss the disease, and natural history. I have provided the patient with an opportunity to ask questions and have all questions answered to her satisfaction.       she will return to clinic PRN, but knows to call in the interim if symptoms change or should a problem arise.        Mayda Saha MD  Hematology and Medical  Oncology  Bone Marrow Transplant  [facility]      BMT Chart Routing      Follow up with physician No follow up needed.   Follow up with MARIBEL    Provider visit type    Infusion scheduling note    Injection scheduling note    Labs    Imaging    Pharmacy appointment    Other referrals    m

## 2023-01-22 NOTE — PROGRESS NOTES
Chief Complaint: Well Woman Exam     HPI:      Ricardo Vizcarra is a 47 y.o.  who presents today for well woman exam.  LMP: Patient's last menstrual period was 2022 (exact date).  No issues, problems, or complaints. Specifically, patient denies abnormal vaginal bleeding, discharge, pelvic pain, urinary problems, or changes in appetite. Ms. Vizcarra is currently sexually active with a single male partner. She declines STD screening today. Patient has regular monthly menses. Patient's last menstrual period was 2022 (exact date). She is currently using no method for contraception. Menopausal complaints: none    Previous Pap: no abnormalities  (2020)  Previous Mammogram: BiRads: 1 T-C Score: 10% Results for orders placed in visit on 21    Mammo Digital Screening Bilat w/ Estuardo    Narrative  Result:  Mammo Digital Screening Bilat w/ Estuardo    History:  Patient is 46 y.o. and is seen for a screening mammogram.      Films Compared:  Prior images (if available) were compared.    Findings:  This procedure was performed using tomosynthesis.  Computer-aided detection was utilized in the interpretation of this examination.    The breasts are heterogeneously dense, which may obscure small masses. There is no evidence of suspicious masses, microcalcifications or architectural distortion.    Impression  No mammographic evidence of malignancy.    BI-RADS Category 1: Negative    Recommendation:  Routine screening mammogram in 1 year is recommended.    Your estimated lifetime risk of breast cancer (to age 85) based on Tyrer-Cuzick risk assessment model is 10.87 %.  According to the American Cancer Society, patients with a lifetime breast cancer risk of 20% or higher might benefit from supplemental screening tests. ??     Colonoscopy: Cologuard negative in past year    Gardasil: Has never had     OB History          5    Para   3    Term   2       1    AB   2    Living   1         SAB   2    IAB         "Ectopic        Multiple        Live Births   1           Obstetric Comments   Menarche at 13               GYN History  Age of Menarche:13  Age at first pregnancy:    Age at first live birth:30      ROS:     GENERAL: Denies unintentional weight gain or weight loss. Feeling well overall.   SKIN: Denies rash or lesions.   HEENT: Denies headaches, or vision changes.   CARDIOVASCULAR: Denies palpitations or chest pain.   RESPIRATORY: Denies shortness of breath or dyspnea on exertion.  BREASTS: Denies pain, lumps, or nipple discharge.   ABDOMEN: Denies abdominal pain, constipation, diarrhea, nausea, vomiting, change in appetite.  URINARY: Denies frequency, dysuria, hematuria.  NEUROLOGIC: Denies syncope or weakness.   PSYCHIATRIC: Denies depression, anxiety or mood swings.    Physical Exam:      PHYSICAL EXAM:  /88   Ht 5' 5.5" (1.664 m)   Wt 54.2 kg (119 lb 9.6 oz)   LMP 11/14/2022 (Exact Date)   BMI 19.60 kg/m²   Body mass index is 19.6 kg/m².     APPEARANCE: Well nourished, well developed, in no acute distress.  PSYCH: Appropriate mood and affect.  SKIN: No acne or hirsutism  NECK: Neck symmetric without masses or thyromegaly  NODES: No inguinal, axillary, or supraclavicular lymph node enlargement  ABDOMEN: Soft.  No tenderness or masses.    CARDIOVASCULAR: No edema of peripheral extremities  BREASTS: Symmetrical, no skin changes or visible lesions.  No palpable masses or nipple discharge bilaterally.  PELVIC: Normal external genitalia without lesions.  Normal hair distribution.  Adequate perineal body, normal urethral meatus.  Vagina moist and well rugated without lesions or discharge.  Cervix pink, without lesions, discharge or tenderness.  No significant cystocele or rectocele.  Bimanual exam shows uterus to be normal size, regular, mobile and nontender.  Adnexa without masses or tenderness.      Pelvic ultrasound:  Narrative & Impression  EXAMINATION:  US PELVIS COMP WITH TRANSVAG NON-OB (XPD)   "   CLINICAL HISTORY:  Unspecified ovarian cyst, unspecified side     TECHNIQUE:  Transabdominal sonography of the pelvis was performed, followed by transvaginal sonography to better evaluate the uterus and ovaries.     COMPARISON:  12/07/2021     FINDINGS:  Uterus:     Size: 7.5 x 5.6 x 6.8 cm     Intramural leiomyoma along the uterine body measures 2.1 cm.  Endometrium is normal caliber measuring 0.7 cm.     Right ovary:     Size: 1.9 x 1 x 1.5 cm     Left ovary:     Size: 2.3 x 1.8 x 0.9 cm.  Single nonshadowing echogenic focus measures 0.5 cm (previously 0.6 cm).     Free Fluid:     None.     Impression:     Single nonshadowing echogenic focus left ovary is stable and could represent a subcentimeter dermoid.     Stable intramural leiomyoma.        Electronically signed by: Mariama Ayala  Date:                                            11/15/2022  Time:                                           15:24    Assessment/Plan:     Encounter for annual routine gynecological examination  Normal exam today. Pap smear up to date and normal. Contraception options reviewed and declined. Mammogram ordered. Ovarian cyst stable and desires to monitor. Refill of Naproxen for dysmenorrhea. Other health maintenance up to date per PCP.     Visit for screening mammogram  -     Mammo Digital Screening Bilat w/ Estuardo; Future; Expected date: 11/15/2022    Dysmenorrhea  -     naproxen (NAPROSYN) 500 MG tablet; Take 1 tablet (500 mg total) by mouth 2 (two) times daily with meals.  Dispense: 60 tablet; Refill: 2        RTC 1 year    Counseling:     Patient was counseled today on current ASCCP pap guidelines, the recommendation for yearly pelvic exams, healthy diet and exercise routines, breast self awareness and annual mammograms.She is to see her PCP for other health maintenance.     Use of the [facility] Patient Portal discussed and encouraged during today's visit.       Tabitha Mckenzie MD      As of April 1, 2021, the Cures Act has been  passed nationally. This new law requires that all doctors progress notes, lab results, pathology reports and radiology reports be released IMMEDIATELY to the patient in the patient portal. That means that the results are released to you at the EXACT same time they are released to me. Therefore, with all of the patients that I have I am not able to reply to each patient exactly when the results come in. So there will be a delay from when you see the results to when I see them and have time to come up with a response to send you. Also I only see these results when I am on the computer at work. So if the results come in over the weekend or after 5 pm of a work day, I will not see them until the next business day. As you can tell, this is a challenge as a physician to give every patient the quick response they hope for and deserve. So please be patient! Thanks for understanding, Dr. Mckenzie

## 2023-01-23 LAB — 1,25(OH)2D3 SERPL-MCNC: 59 PG/ML (ref 20–79)

## 2023-01-25 LAB
HGB A2 MFR BLD HPLC: 2.3 % (ref 2.2–3.2)
HGB FRACT BLD ELPH-IMP: NORMAL
HGB FRACT BLD ELPH-IMP: NORMAL

## 2023-01-27 PROBLEM — D56.3 ALPHA THALASSEMIA TRAIT: Status: ACTIVE | Noted: 2023-01-27

## 2023-06-05 ENCOUNTER — OFFICE VISIT (OUTPATIENT)
Dept: PRIMARY CARE CLINIC | Facility: CLINIC | Age: 48
End: 2023-06-05
Payer: COMMERCIAL

## 2023-06-05 VITALS
OXYGEN SATURATION: 99 % | DIASTOLIC BLOOD PRESSURE: 68 MMHG | SYSTOLIC BLOOD PRESSURE: 116 MMHG | HEART RATE: 76 BPM | HEIGHT: 67 IN | WEIGHT: 119.94 LBS | BODY MASS INDEX: 18.82 KG/M2

## 2023-06-05 DIAGNOSIS — Z00.00 WELLNESS EXAMINATION: Primary | ICD-10-CM

## 2023-06-05 DIAGNOSIS — Z12.31 ENCOUNTER FOR SCREENING MAMMOGRAM FOR MALIGNANT NEOPLASM OF BREAST: ICD-10-CM

## 2023-06-05 DIAGNOSIS — D56.3 ALPHA THALASSEMIA TRAIT: ICD-10-CM

## 2023-06-05 DIAGNOSIS — N94.0 OVULATORY PAIN: ICD-10-CM

## 2023-06-05 PROCEDURE — 3078F DIAST BP <80 MM HG: CPT | Mod: CPTII,S$GLB,, | Performed by: INTERNAL MEDICINE

## 2023-06-05 PROCEDURE — 1159F MED LIST DOCD IN RCRD: CPT | Mod: CPTII,S$GLB,, | Performed by: INTERNAL MEDICINE

## 2023-06-05 PROCEDURE — 3008F PR BODY MASS INDEX (BMI) DOCUMENTED: ICD-10-PCS | Mod: CPTII,S$GLB,, | Performed by: INTERNAL MEDICINE

## 2023-06-05 PROCEDURE — 3008F BODY MASS INDEX DOCD: CPT | Mod: CPTII,S$GLB,, | Performed by: INTERNAL MEDICINE

## 2023-06-05 PROCEDURE — 99396 PR PREVENTIVE VISIT,EST,40-64: ICD-10-PCS | Mod: S$GLB,,, | Performed by: INTERNAL MEDICINE

## 2023-06-05 PROCEDURE — 99999 PR PBB SHADOW E&M-EST. PATIENT-LVL IV: ICD-10-PCS | Mod: PBBFAC,,, | Performed by: INTERNAL MEDICINE

## 2023-06-05 PROCEDURE — 3074F PR MOST RECENT SYSTOLIC BLOOD PRESSURE < 130 MM HG: ICD-10-PCS | Mod: CPTII,S$GLB,, | Performed by: INTERNAL MEDICINE

## 2023-06-05 PROCEDURE — 99999 PR PBB SHADOW E&M-EST. PATIENT-LVL IV: CPT | Mod: PBBFAC,,, | Performed by: INTERNAL MEDICINE

## 2023-06-05 PROCEDURE — 3078F PR MOST RECENT DIASTOLIC BLOOD PRESSURE < 80 MM HG: ICD-10-PCS | Mod: CPTII,S$GLB,, | Performed by: INTERNAL MEDICINE

## 2023-06-05 PROCEDURE — 99396 PREV VISIT EST AGE 40-64: CPT | Mod: S$GLB,,, | Performed by: INTERNAL MEDICINE

## 2023-06-05 PROCEDURE — 1159F PR MEDICATION LIST DOCUMENTED IN MEDICAL RECORD: ICD-10-PCS | Mod: CPTII,S$GLB,, | Performed by: INTERNAL MEDICINE

## 2023-06-05 PROCEDURE — 3074F SYST BP LT 130 MM HG: CPT | Mod: CPTII,S$GLB,, | Performed by: INTERNAL MEDICINE

## 2023-06-05 NOTE — PROGRESS NOTES
[facility] Note    Chief Complaint      Chief Complaint   Patient presents with    Annual Exam     History of Present Illness      Ricardo Vizcarra is a 48 y.o. female who presents today for chief complaint annual wellness . Patient previously seen by me    PCP: Ashley Lind MD  Patient comes to appointment alone.     HPI   Microcytic anemia 2/2 alpha thal as diagnosed by HO   Last seen 4.22 annual   Teaches biostats to public health at [employer], originally from [facility]    Sees Gyn [facility]   Has 2 kids 17& 12, they ho to [place]   Mood is ok, sleep is ok, does exercie, swims twice weekly at [employer], not really watching diet   Cologuard 4.22  Due now for mmg  Pap 11.20  Eh had a folow up CBC in Jan, cmp and lipids last April were wnl - will repeat in 1 year   Has some abdominal bloating during ovulatoin, she has pain unilateral, no change in bowel havbi  Active Problem List with Overview Notes    Diagnosis Date Noted    Alpha thalassemia trait 01/27/2023       Health Maintenance   Topic Date Due    TETANUS VACCINE  Never done    Mammogram  12/16/2022    Lipid Panel  04/22/2027    Hepatitis C Screening  Completed       History reviewed. No pertinent past medical history.    Past Surgical History:   Procedure Laterality Date    BREAST CYST EXCISION Left        family history includes Hypertension in her father and mother; Ovarian cancer (age of onset: 66) in her mother.    Social History     Tobacco Use    Smoking status: Never    Smokeless tobacco: Never   Substance Use Topics    Alcohol use: Not Currently    Drug use: Never       Review of Systems   Constitutional:  Negative for chills, fever, malaise/fatigue and weight loss.   Respiratory:  Negative for cough, sputum production, shortness of breath and wheezing.    Cardiovascular:  Negative for chest pain, palpitations, orthopnea and leg swelling.   Gastrointestinal:  Positive for abdominal pain. Negative for constipation, diarrhea, nausea and  "vomiting.   Genitourinary:  Negative for dysuria, frequency, hematuria and urgency.      Outpatient Encounter Medications as of 6/5/2023   Medication Sig Dispense Refill    naproxen (NAPROSYN) 500 MG tablet Take 1 tablet (500 mg total) by mouth 2 (two) times daily with meals. 60 tablet 2     No facility-administered encounter medications on file as of 6/5/2023.        Review of patient's allergies indicates:   Allergen Reactions    Latex, natural rubber Rash           Physical Exam      Vital Signs  Pulse: 76  SpO2: 99 %  BP: 116/68  BP Location: Left arm  Patient Position: Sitting  Height and Weight  Height: 5' 6.61" (169.2 cm)  Weight: 54.4 kg (119 lb 14.9 oz)  BSA (Calculated - sq m): 1.6 sq meters  BMI (Calculated): 19  Weight in (lb) to have BMI = 25: 157.4]    Physical Exam  Vitals reviewed.   Constitutional:       General: She is not in acute distress.     Appearance: She is well-developed. She is not diaphoretic.   HENT:      Head: Normocephalic and atraumatic.      Right Ear: External ear normal.      Left Ear: External ear normal.      Nose: Nose normal.   Eyes:      General:         Right eye: No discharge.         Left eye: No discharge.      Conjunctiva/sclera: Conjunctivae normal.   Cardiovascular:      Rate and Rhythm: Normal rate and regular rhythm.      Heart sounds: Normal heart sounds.   Pulmonary:      Effort: Pulmonary effort is normal. No respiratory distress.      Breath sounds: Normal breath sounds.   Musculoskeletal:         General: Normal range of motion.      Cervical back: Normal range of motion.   Skin:     Coloration: Skin is not pale.      Findings: No rash.   Neurological:      Mental Status: She is alert and oriented to person, place, and time.   Psychiatric:         Behavior: Behavior normal.         Thought Content: Thought content normal.        Laboratory:  CBC:  No results for input(s): WBC, RBC, HGB, HCT, PLT, MCV, MCH, MCHC in the last 2160 hours.  CMP:  No results for " input(s): GLU, CALCIUM, ALBUMIN, PROT, NA, K, CO2, CL, BUN, ALKPHOS, ALT, AST, BILITOT in the last 2160 hours.    Invalid input(s): CREATININ  URINALYSIS:  No results for input(s): COLORU, CLARITYU, SPECGRAV, PHUR, PROTEINUA, GLUCOSEU, BILIRUBINCON, BLOODU, WBCU, RBCU, BACTERIA, MUCUS, NITRITE, LEUKOCYTESUR, UROBILINOGEN, HYALINECASTS in the last 2160 hours.   LIPIDS:  No results for input(s): TSH, HDL, CHOL, TRIG, LDLCALC, CHOLHDL, NONHDLCHOL, TOTALCHOLEST in the last 2160 hours.  TSH:  No results for input(s): TSH in the last 2160 hours.  A1C:  No results for input(s): HGBA1C in the last 2160 hours.    Radiology:      Assessment/Plan     Ricardo Vizcarra is a 48 y.o.female with:    1. Wellness examination    2. Encounter for screening mammogram for malignant neoplasm of breast  -     Cancel: Mammo Digital Screening Bilat w/ Estuardo; Future; Expected date: 06/05/2023  -     Mammo Digital Screening Bilat w/ Estuardo; Future; Expected date: 12/01/2023  -     US Abdomen Complete; Future; Expected date: 06/05/2023    3. Ovulatory pain  -     US Pelvis Complete Non OB; Future; Expected date: 06/05/2023    4. Alpha thalassemia trait  Assessment & Plan:  Cbc stable           Use of the [facility] Patient Portal discussed and encouraged during today's visit  -Continue current medications and maintain follow up with specialists.  Return to clinic in 1 year prn .  No future appointments.    Ashley Lind MD  6/5/2023 3:06 PM    Primary Care Internal Medicine

## 2023-07-05 ENCOUNTER — HOSPITAL ENCOUNTER (OUTPATIENT)
Dept: RADIOLOGY | Facility: HOSPITAL | Age: 48
Discharge: HOME | End: 2023-07-05
Attending: INTERNAL MEDICINE
Payer: COMMERCIAL

## 2023-07-05 ENCOUNTER — TELEPHONE (OUTPATIENT)
Dept: PRIMARY CARE CLINIC | Facility: CLINIC | Age: 48
End: 2023-07-05
Payer: COMMERCIAL

## 2023-07-05 DIAGNOSIS — N94.0 OVULATORY PAIN: ICD-10-CM

## 2023-07-05 DIAGNOSIS — N94.0 OVULATORY PAIN: Primary | ICD-10-CM

## 2023-07-05 PROCEDURE — 76856 US EXAM PELVIC COMPLETE: CPT | Mod: 26,,, | Performed by: RADIOLOGY

## 2023-07-05 PROCEDURE — 76830 TRANSVAGINAL US NON-OB: CPT | Mod: 26,,, | Performed by: RADIOLOGY

## 2023-07-05 PROCEDURE — 76830 US PELVIS COMP WITH TRANSVAG NON-OB (XPD): ICD-10-PCS | Mod: 26,,, | Performed by: RADIOLOGY

## 2023-07-05 PROCEDURE — 76856 US EXAM PELVIC COMPLETE: CPT | Mod: TC

## 2023-07-05 PROCEDURE — 76856 US PELVIS COMP WITH TRANSVAG NON-OB (XPD): ICD-10-PCS | Mod: 26,,, | Performed by: RADIOLOGY

## 2023-07-05 NOTE — TELEPHONE ENCOUNTER
----- Message from Елена Agustin sent at 7/5/2023 11:34 AM CDT -----  Contact: [phone]  Pt is requesting orders for a US Pelvis Complete Non OB and US Abdomen Complete. Thanks

## 2023-07-05 NOTE — TELEPHONE ENCOUNTER
Spoke with pt and she was advised to call back to schedule imaging when she is having symptoms. Soonest appt I could find was Monday here.     Asking if orders can be changed to STAT to find sooner appt

## 2023-07-31 ENCOUNTER — TELEPHONE (OUTPATIENT)
Dept: OPTOMETRY | Facility: CLINIC | Age: 48
End: 2023-07-31

## 2023-07-31 ENCOUNTER — OFFICE VISIT (OUTPATIENT)
Dept: OPTOMETRY | Facility: CLINIC | Age: 48
End: 2023-07-31
Payer: COMMERCIAL

## 2023-07-31 DIAGNOSIS — H52.4 MYOPIA WITH PRESBYOPIA OF BOTH EYES: Primary | ICD-10-CM

## 2023-07-31 DIAGNOSIS — H52.13 MYOPIA WITH PRESBYOPIA OF BOTH EYES: Primary | ICD-10-CM

## 2023-07-31 PROCEDURE — 92015 DETERMINE REFRACTIVE STATE: CPT | Mod: S$GLB,,, | Performed by: OPTOMETRIST

## 2023-07-31 PROCEDURE — 92014 PR EYE EXAM, EST PATIENT,COMPREHESV: ICD-10-PCS | Mod: S$GLB,,, | Performed by: OPTOMETRIST

## 2023-07-31 PROCEDURE — 92014 COMPRE OPH EXAM EST PT 1/>: CPT | Mod: S$GLB,,, | Performed by: OPTOMETRIST

## 2023-07-31 PROCEDURE — 99999 PR PBB SHADOW E&M-EST. PATIENT-LVL III: ICD-10-PCS | Mod: PBBFAC,,, | Performed by: OPTOMETRIST

## 2023-07-31 PROCEDURE — 92015 PR REFRACTION: ICD-10-PCS | Mod: S$GLB,,, | Performed by: OPTOMETRIST

## 2023-07-31 PROCEDURE — 99999 PR PBB SHADOW E&M-EST. PATIENT-LVL III: CPT | Mod: PBBFAC,,, | Performed by: OPTOMETRIST

## 2023-07-31 NOTE — PROGRESS NOTES
HPI    48 year old female is here today for annual exam. Pt states that she   frequently feels that eyes are dry. States that she is finding it harder   to read smaller prints as well as reading on her laptop. No   pain/discomfort. No f/f. No gtts.   Last edited by Pamella Pham on 7/31/2023  8:18 AM.            Assessment /Plan     For exam results, see Encounter Report.    Myopia with presbyopia of both eyes      Rx change--wrote new spex Rx    PLAN:    Rtc 1 yr

## 2023-07-31 NOTE — TELEPHONE ENCOUNTER
Clinic spoke to pt per Dr. Coulter and informed her that updated glasses Rx from 07/31/2023 [facility] eye exam has been updated in [facility] portal for her to print at her convenience. Pt noted understanding.

## 2023-08-23 ENCOUNTER — PATIENT MESSAGE (OUTPATIENT)
Dept: PRIMARY CARE CLINIC | Facility: CLINIC | Age: 48
End: 2023-08-23
Payer: COMMERCIAL

## 2023-08-23 DIAGNOSIS — Z00.00 WELLNESS EXAMINATION: Primary | ICD-10-CM

## 2023-08-23 NOTE — TELEPHONE ENCOUNTER
I have pt form filled out. Pt seen you 6/5/23 for Annual Wellness but no labs were ordered. Last Lipid and CMP done 04/22/2022. Please advise.     POLINA Avitia

## 2023-08-28 ENCOUNTER — LAB VISIT (OUTPATIENT)
Dept: LAB | Facility: HOSPITAL | Age: 48
End: 2023-08-28
Attending: INTERNAL MEDICINE
Payer: COMMERCIAL

## 2023-08-28 DIAGNOSIS — Z00.00 WELLNESS EXAMINATION: ICD-10-CM

## 2023-08-28 LAB
ALBUMIN SERPL BCP-MCNC: 4.2 G/DL (ref 3.5–5.2)
ALP SERPL-CCNC: 54 U/L (ref 55–135)
ALT SERPL W/O P-5'-P-CCNC: 9 U/L (ref 10–44)
ANION GAP SERPL CALC-SCNC: 7 MMOL/L (ref 8–16)
AST SERPL-CCNC: 13 U/L (ref 10–40)
BILIRUB SERPL-MCNC: 0.7 MG/DL (ref 0.1–1)
BUN SERPL-MCNC: 12 MG/DL (ref 6–20)
CALCIUM SERPL-MCNC: 9.1 MG/DL (ref 8.7–10.5)
CHLORIDE SERPL-SCNC: 109 MMOL/L (ref 95–110)
CHOLEST SERPL-MCNC: 159 MG/DL (ref 120–199)
CHOLEST/HDLC SERPL: 3 {RATIO} (ref 2–5)
CO2 SERPL-SCNC: 24 MMOL/L (ref 23–29)
CREAT SERPL-MCNC: 0.8 MG/DL (ref 0.5–1.4)
EST. GFR  (NO RACE VARIABLE): >60 ML/MIN/1.73 M^2
GLUCOSE SERPL-MCNC: 98 MG/DL (ref 70–110)
HDLC SERPL-MCNC: 53 MG/DL (ref 40–75)
HDLC SERPL: 33.3 % (ref 20–50)
LDLC SERPL CALC-MCNC: 93.4 MG/DL (ref 63–159)
NONHDLC SERPL-MCNC: 106 MG/DL
POTASSIUM SERPL-SCNC: 3.9 MMOL/L (ref 3.5–5.1)
PROT SERPL-MCNC: 7.1 G/DL (ref 6–8.4)
SODIUM SERPL-SCNC: 140 MMOL/L (ref 136–145)
TRIGL SERPL-MCNC: 63 MG/DL (ref 30–150)

## 2023-08-28 PROCEDURE — 80061 LIPID PANEL: CPT | Performed by: INTERNAL MEDICINE

## 2023-08-28 PROCEDURE — 80053 COMPREHEN METABOLIC PANEL: CPT | Performed by: INTERNAL MEDICINE

## 2023-09-25 ENCOUNTER — PATIENT MESSAGE (OUTPATIENT)
Dept: PRIMARY CARE CLINIC | Facility: CLINIC | Age: 48
End: 2023-09-25
Payer: COMMERCIAL

## 2023-11-06 ENCOUNTER — OFFICE VISIT (OUTPATIENT)
Dept: DERMATOLOGY | Facility: CLINIC | Age: 48
End: 2023-11-06
Payer: COMMERCIAL

## 2023-11-06 ENCOUNTER — TELEPHONE (OUTPATIENT)
Dept: DERMATOLOGY | Facility: CLINIC | Age: 48
End: 2023-11-06

## 2023-11-06 DIAGNOSIS — D48.5 NEOPLASM OF UNCERTAIN BEHAVIOR OF SKIN: Primary | ICD-10-CM

## 2023-11-06 PROCEDURE — 1159F MED LIST DOCD IN RCRD: CPT | Mod: CPTII,95,, | Performed by: DERMATOLOGY

## 2023-11-06 PROCEDURE — 1159F PR MEDICATION LIST DOCUMENTED IN MEDICAL RECORD: ICD-10-PCS | Mod: CPTII,95,, | Performed by: DERMATOLOGY

## 2023-11-06 PROCEDURE — 1160F PR REVIEW ALL MEDS BY PRESCRIBER/CLIN PHARMACIST DOCUMENTED: ICD-10-PCS | Mod: CPTII,95,, | Performed by: DERMATOLOGY

## 2023-11-06 PROCEDURE — 99202 OFFICE O/P NEW SF 15 MIN: CPT | Mod: 95,,, | Performed by: DERMATOLOGY

## 2023-11-06 PROCEDURE — 99202 PR OFFICE/OUTPT VISIT, NEW, LEVL II, 15-29 MIN: ICD-10-PCS | Mod: 95,,, | Performed by: DERMATOLOGY

## 2023-11-06 PROCEDURE — 1160F RVW MEDS BY RX/DR IN RCRD: CPT | Mod: CPTII,95,, | Performed by: DERMATOLOGY

## 2023-11-06 NOTE — TELEPHONE ENCOUNTER
Called pt to schedule appt.    Problem: MOBILITY - ADULT  Goal: Maintain or return to baseline ADL function  Description: INTERVENTIONS:  -  Assess patient's ability to carry out ADLs; assess patient's baseline for ADL function and identify physical deficits which impact ability to perform ADLs (bathing, care of mouth/teeth, toileting, grooming, dressing, etc.)  - Assess/evaluate cause of self-care deficits   - Assess range of motion  - Assess patient's mobility; develop plan if impaired  - Assess patient's need for assistive devices and provide as appropriate  - Encourage maximum independence but intervene and supervise when necessary  - Involve family in performance of ADLs  - Assess for home care needs following discharge   - Consider OT consult to assist with ADL evaluation and planning for discharge  - Provide patient education as appropriate  Outcome: Progressing  Goal: Maintains/Returns to pre admission functional level  Description: INTERVENTIONS:  - Perform BMAT or MOVE assessment daily.   - Set and communicate daily mobility goal to care team and patient/family/caregiver. - Collaborate with rehabilitation services on mobility goals if consulted  - Perform Range of Motion  times a day. - Reposition patient every  hours.   - Dangle patient  times a day  - Stand patient  times a day  - Ambulate patient  times a day  - Out of bed to chair  times a day   - Out of bed for meals times a day  - Out of bed for toileting  - Record patient progress and toleration of activity level   Outcome: Progressing     Problem: PAIN - ADULT  Goal: Verbalizes/displays adequate comfort level or baseline comfort level  Description: Interventions:  - Encourage patient to monitor pain and request assistance  - Assess pain using appropriate pain scale  - Administer analgesics based on type and severity of pain and evaluate response  - Implement non-pharmacological measures as appropriate and evaluate response  - Consider cultural and social influences on pain and pain management  - Notify physician/advanced practitioner if interventions unsuccessful or patient reports new pain  Outcome: Progressing     Problem: INFECTION - ADULT  Goal: Absence or prevention of progression during hospitalization  Description: INTERVENTIONS:  - Assess and monitor for signs and symptoms of infection  - Monitor lab/diagnostic results  - Monitor all insertion sites, i.e. indwelling lines, tubes, and drains  - Monitor endotracheal if appropriate and nasal secretions for changes in amount and color  - Henderson appropriate cooling/warming therapies per order  - Administer medications as ordered  - Instruct and encourage patient and family to use good hand hygiene technique  - Identify and instruct in appropriate isolation precautions for identified infection/condition  Outcome: Progressing  Goal: Absence of fever/infection during neutropenic period  Description: INTERVENTIONS:  - Monitor WBC    Outcome: Progressing

## 2023-11-06 NOTE — PATIENT INSTRUCTIONS

## 2023-11-06 NOTE — PROGRESS NOTES
Subjective:      Patient ID:  Ricardo Vizcarra is a 48 y.o. female who presents for   Chief Complaint   Patient presents with    Mole     Mole      The patient location is: home  The chief complaint leading to consultation is: moles    Visit type: audiovisual    Face to Face time with patient: 5 min  8 minutes of total time spent on the encounter, which includes face to face time and non-face to face time preparing to see the patient (eg, review of tests), Obtaining and/or reviewing separately obtained history, Documenting clinical information in the electronic or other health record, Independently interpreting results (not separately reported) and communicating results to the patient/family/caregiver, or Care coordination (not separately reported).     Each patient to whom he or she provides medical services by telemedicine is:  (1) informed of the relationship between the physician and patient and the respective role of any other health care provider with respect to management of the patient; and (2) notified that he or she may decline to receive medical services by telemedicine and may withdraw from such care at any time.    Notes:     Pt here today via video visit for new mole on face:   2 wks ago, it felt tough, she removed it twice, and it grew back both times.    Has another one near it which may also be new, but it's not as easy for her to see.    Review of Systems   Constitutional:  Negative for fever and chills.   Skin:  Negative for itching and rash.       Objective:   Physical Exam       Diagram Legend     Erythematous scaling macule/papule c/w actinic keratosis       Vascular papule c/w angioma      Pigmented verrucoid papule/plaque c/w seborrheic keratosis      Yellow umbilicated papule c/w sebaceous hyperplasia      Irregularly shaped tan macule c/w lentigo     1-2 mm smooth white papules consistent with Milia      Movable subcutaneous cyst with punctum c/w epidermal inclusion cyst      Subcutaneous  movable cyst c/w pilar cyst      Firm pink to brown papule c/w dermatofibroma      Pedunculated fleshy papule(s) c/w skin tag(s)      Evenly pigmented macule c/w junctional nevus     Mildly variegated pigmented, slightly irregular-bordered macule c/w mildly atypical nevus      Flesh colored to evenly pigmented papule c/w intradermal nevus       Pink pearly papule/plaque c/w basal cell carcinoma      Erythematous hyperkeratotic cursted plaque c/w SCC      Surgical scar with no sign of skin cancer recurrence      Open and closed comedones      Inflammatory papules and pustules      Verrucoid papule consistent consistent with wart     Erythematous eczematous patches and plaques     Dystrophic onycholytic nail with subungual debris c/w onychomycosis     Umbilicated papule    Erythematous-base heme-crusted tan verrucoid plaque consistent with inflamed seborrheic keratosis     Erythematous Silvery Scaling Plaque c/w Psoriasis     See annotation      Assessment / Plan:        Neoplasm of uncertain behavior of skin    Likely seborrheic keratosis based on history, but counseled pt that I would prefer to evaluate this lesion in-person with dermoscopy to ensure it doesn't need a biopsy.  My staff will contact her to schedule this.

## 2023-12-14 ENCOUNTER — HOSPITAL ENCOUNTER (OUTPATIENT)
Dept: RADIOLOGY | Facility: HOSPITAL | Age: 48
Discharge: HOME | End: 2023-12-14
Attending: OBSTETRICS & GYNECOLOGY
Payer: COMMERCIAL

## 2023-12-14 VITALS — BODY MASS INDEX: 19.13 KG/M2 | WEIGHT: 119 LBS | HEIGHT: 66 IN

## 2023-12-14 DIAGNOSIS — Z12.31 VISIT FOR SCREENING MAMMOGRAM: ICD-10-CM

## 2023-12-14 PROCEDURE — 77067 SCR MAMMO BI INCL CAD: CPT | Mod: 26,,, | Performed by: RADIOLOGY

## 2023-12-14 PROCEDURE — 77063 BREAST TOMOSYNTHESIS BI: CPT | Mod: 26,,, | Performed by: RADIOLOGY

## 2023-12-14 PROCEDURE — 77067 SCR MAMMO BI INCL CAD: CPT | Mod: TC

## 2023-12-14 PROCEDURE — 77063 MAMMO DIGITAL SCREENING BILAT WITH TOMO: ICD-10-PCS | Mod: 26,,, | Performed by: RADIOLOGY

## 2023-12-14 PROCEDURE — 77067 MAMMO DIGITAL SCREENING BILAT WITH TOMO: ICD-10-PCS | Mod: 26,,, | Performed by: RADIOLOGY

## 2023-12-20 ENCOUNTER — PATIENT MESSAGE (OUTPATIENT)
Dept: OBSTETRICS AND GYNECOLOGY | Facility: CLINIC | Age: 48
End: 2023-12-20
Payer: COMMERCIAL

## 2024-01-10 ENCOUNTER — OFFICE VISIT (OUTPATIENT)
Dept: OBSTETRICS AND GYNECOLOGY | Facility: CLINIC | Age: 49
End: 2024-01-10
Payer: COMMERCIAL

## 2024-01-10 VITALS
BODY MASS INDEX: 19.42 KG/M2 | DIASTOLIC BLOOD PRESSURE: 86 MMHG | WEIGHT: 120.81 LBS | HEIGHT: 66 IN | SYSTOLIC BLOOD PRESSURE: 125 MMHG

## 2024-01-10 DIAGNOSIS — Z01.419 ENCOUNTER FOR ANNUAL ROUTINE GYNECOLOGICAL EXAMINATION: Primary | ICD-10-CM

## 2024-01-10 DIAGNOSIS — Z12.31 ENCOUNTER FOR SCREENING MAMMOGRAM FOR BREAST CANCER: ICD-10-CM

## 2024-01-10 DIAGNOSIS — Z12.4 ENCOUNTER FOR PAPANICOLAOU SMEAR FOR CERVICAL CANCER SCREENING: ICD-10-CM

## 2024-01-10 DIAGNOSIS — N95.1 PERIMENOPAUSE: ICD-10-CM

## 2024-01-10 DIAGNOSIS — Z11.51 ENCOUNTER FOR SCREENING FOR HUMAN PAPILLOMAVIRUS (HPV): ICD-10-CM

## 2024-01-10 PROCEDURE — 88175 CYTOPATH C/V AUTO FLUID REDO: CPT | Performed by: OBSTETRICS & GYNECOLOGY

## 2024-01-10 PROCEDURE — 3008F BODY MASS INDEX DOCD: CPT | Mod: CPTII,S$GLB,, | Performed by: OBSTETRICS & GYNECOLOGY

## 2024-01-10 PROCEDURE — 3074F SYST BP LT 130 MM HG: CPT | Mod: CPTII,S$GLB,, | Performed by: OBSTETRICS & GYNECOLOGY

## 2024-01-10 PROCEDURE — 87624 HPV HI-RISK TYP POOLED RSLT: CPT | Performed by: OBSTETRICS & GYNECOLOGY

## 2024-01-10 PROCEDURE — 3079F DIAST BP 80-89 MM HG: CPT | Mod: CPTII,S$GLB,, | Performed by: OBSTETRICS & GYNECOLOGY

## 2024-01-10 PROCEDURE — 1160F RVW MEDS BY RX/DR IN RCRD: CPT | Mod: CPTII,S$GLB,, | Performed by: OBSTETRICS & GYNECOLOGY

## 2024-01-10 PROCEDURE — 1159F MED LIST DOCD IN RCRD: CPT | Mod: CPTII,S$GLB,, | Performed by: OBSTETRICS & GYNECOLOGY

## 2024-01-10 PROCEDURE — 99999 PR PBB SHADOW E&M-EST. PATIENT-LVL III: CPT | Mod: PBBFAC,,, | Performed by: OBSTETRICS & GYNECOLOGY

## 2024-01-10 PROCEDURE — 99396 PREV VISIT EST AGE 40-64: CPT | Mod: S$GLB,,, | Performed by: OBSTETRICS & GYNECOLOGY

## 2024-01-11 NOTE — PROGRESS NOTES
Chief Complaint: Well Woman Exam     HPI:      Ricardo Vizcarra is a 48 y.o.  who presents today for well woman exam.  LMP: Patient's last menstrual period was 2023.  No issues, problems, or complaints. Specifically, patient denies abnormal vaginal bleeding, discharge, pelvic pain, urinary problems, or changes in appetite. Ms. Vizcarra is currently sexually active with a single male partner. She declines STD screening today. Patient has regular monthly menses. Patient's last menstrual period was 2023. She is currently using no method for contraception. Menopausal complaints: none    Previous Pap: no abnormalities/HPV negative  (2024)  Previous Mammogram: BiRads: 1 T-C Score: 10.28% Results for orders placed during the hospital encounter of 23    Mammo Digital Screening Bilat w/ Estuardo    Narrative  Result:  Mammo Digital Screening Bilat w/ Estuardo    History:  Patient is 48 y.o. and is seen for a screening mammogram.    Films Compared:  Compared to: 2021 Mammo Digital Screening Bilat w/ Estuardo and 2020 Mammo Digital Screening Bilat w/ Estuardo    Findings:  This procedure was performed using tomosynthesis. Computer-aided detection was utilized in the interpretation of this examination.  The breasts are heterogeneously dense, which may obscure small masses. There is no evidence of suspicious masses, calcifications, or other abnormal findings.    Impression  Bilateral  There is no mammographic evidence of malignancy.    BI-RADS Category:  Overall: 1 - Negative      Recommendation:  Routine screening mammogram in 1 year is recommended.      Your estimated lifetime risk of breast cancer (to age 85) based on Tyrer-Cuzick risk assessment model is Tyrer-Cuzick: 10.28 %. According to the American Cancer Society, patients with a lifetime breast cancer risk of 20% or higher might benefit from supplemental screening tests.     Colonoscopy: not yet, recommend per PCP    OB History          5    Para  "  3    Term   2       1    AB   2    Living   1         SAB   2    IAB        Ectopic        Multiple        Live Births   1           Obstetric Comments   Menarche at 13               GYN History  Age of Menarche:13  Age at first pregnancy:    Age at first live birth:30  No abnormal pap or STDs      ROS:     GENERAL: Denies unintentional weight gain or weight loss. Feeling well overall.   SKIN: Denies rash or lesions.   HEENT: Denies headaches, or vision changes.   CARDIOVASCULAR: Denies palpitations or chest pain.   RESPIRATORY: Denies shortness of breath or dyspnea on exertion.  BREASTS: Denies pain, lumps, or nipple discharge.   ABDOMEN: Denies abdominal pain, constipation, diarrhea, nausea, vomiting, change in appetite.  URINARY: Denies frequency, dysuria, hematuria.  NEUROLOGIC: Denies syncope or weakness.   PSYCHIATRIC: Denies depression, anxiety or mood swings.    Physical Exam:      PHYSICAL EXAM:  /86   Ht 5' 6" (1.676 m)   Wt 54.8 kg (120 lb 13 oz)   LMP 2023   BMI 19.50 kg/m²   Body mass index is 19.5 kg/m².     APPEARANCE: Well nourished, well developed, in no acute distress.  PSYCH: Appropriate mood and affect.  SKIN: No acne or hirsutism  NECK: Neck symmetric without masses or thyromegaly  NODES: No inguinal, axillary, or supraclavicular lymph node enlargement  ABDOMEN: Soft.  No tenderness or masses.    CARDIOVASCULAR: No edema of peripheral extremities  BREASTS: Symmetrical, no skin changes or visible lesions.  No palpable masses or nipple discharge bilaterally.  PELVIC: Normal external genitalia without lesions.  Normal hair distribution.  Adequate perineal body, normal urethral meatus.  Vagina moist and well rugated without lesions or discharge.  Cervix pink, without lesions, discharge or tenderness.  No significant cystocele or rectocele.  Bimanual exam shows uterus to be normal size, regular, mobile and nontender.  Adnexa without masses or tenderness.      Assessment/Plan: "     Encounter for annual routine gynecological examination    Encounter for Papanicolaou smear for cervical cancer screening  -     Liquid-Based Pap Smear, Screening    Encounter for screening for human papillomavirus (HPV)  -     HPV High Risk Genotypes, PCR    Encounter for screening mammogram for breast cancer  -     Mammo Digital Screening Bilat w/ Estuardo; Future; Expected date: 01/10/2024    Perimenopause        RTC 1 year    Counseling:     Patient was counseled today on current ASCCP pap guidelines, the recommendation for yearly pelvic exams, healthy diet and exercise routines, breast self awareness and annual mammograms.She is to see her PCP for other health maintenance.     Use of the [employer] Patient Portal discussed and encouraged during today's visit.       Tabitha Mckenzie MD      As of April 1, 2021, the Cures Act has been passed nationally. This new law requires that all doctors progress notes, lab results, pathology reports and radiology reports be released IMMEDIATELY to the patient in the patient portal. That means that the results are released to you at the EXACT same time they are released to me. Therefore, with all of the patients that I have I am not able to reply to each patient exactly when the results come in. So there will be a delay from when you see the results to when I see them and have time to come up with a response to send you. Also I only see these results when I am on the computer at work. So if the results come in over the weekend or after 5 pm of a work day, I will not see them until the next business day. As you can tell, this is a challenge as a physician to give every patient the quick response they hope for and deserve. So please be patient! Thanks for understanding, Dr. Mckenzie

## 2024-01-16 LAB
FINAL PATHOLOGIC DIAGNOSIS: NORMAL
Lab: NORMAL

## 2024-06-05 ENCOUNTER — OFFICE VISIT (OUTPATIENT)
Dept: PRIMARY CARE CLINIC | Facility: CLINIC | Age: 49
End: 2024-06-05
Payer: COMMERCIAL

## 2024-06-05 ENCOUNTER — PATIENT MESSAGE (OUTPATIENT)
Dept: PRIMARY CARE CLINIC | Facility: CLINIC | Age: 49
End: 2024-06-05

## 2024-06-05 VITALS
BODY MASS INDEX: 19.03 KG/M2 | DIASTOLIC BLOOD PRESSURE: 70 MMHG | OXYGEN SATURATION: 99 % | SYSTOLIC BLOOD PRESSURE: 116 MMHG | HEART RATE: 89 BPM | WEIGHT: 118.38 LBS | HEIGHT: 66 IN

## 2024-06-05 DIAGNOSIS — L30.9 DERMATITIS: ICD-10-CM

## 2024-06-05 DIAGNOSIS — H00.19 CHALAZION, UNSPECIFIED LATERALITY: ICD-10-CM

## 2024-06-05 DIAGNOSIS — N92.6 IRREGULAR PERIODS: ICD-10-CM

## 2024-06-05 DIAGNOSIS — Z00.00 WELLNESS EXAMINATION: Primary | ICD-10-CM

## 2024-06-05 PROCEDURE — 3078F DIAST BP <80 MM HG: CPT | Mod: CPTII,S$GLB,, | Performed by: INTERNAL MEDICINE

## 2024-06-05 PROCEDURE — 1159F MED LIST DOCD IN RCRD: CPT | Mod: CPTII,S$GLB,, | Performed by: INTERNAL MEDICINE

## 2024-06-05 PROCEDURE — 3074F SYST BP LT 130 MM HG: CPT | Mod: CPTII,S$GLB,, | Performed by: INTERNAL MEDICINE

## 2024-06-05 PROCEDURE — 99396 PREV VISIT EST AGE 40-64: CPT | Mod: S$GLB,,, | Performed by: INTERNAL MEDICINE

## 2024-06-05 PROCEDURE — 99213 OFFICE O/P EST LOW 20 MIN: CPT | Mod: 25,S$GLB,, | Performed by: INTERNAL MEDICINE

## 2024-06-05 PROCEDURE — 99999 PR PBB SHADOW E&M-EST. PATIENT-LVL III: CPT | Mod: PBBFAC,,, | Performed by: INTERNAL MEDICINE

## 2024-06-05 PROCEDURE — 3008F BODY MASS INDEX DOCD: CPT | Mod: CPTII,S$GLB,, | Performed by: INTERNAL MEDICINE

## 2024-06-05 RX ORDER — NEOMYCIN AND POLYMYXIN B SULFATES AND BACITRACIN ZINC 400; 3.5; 1 [USP'U]/G; MG/G; [USP'U]/G
OINTMENT OPHTHALMIC 4 TIMES DAILY
Qty: 3.5 G | Refills: 0 | Status: SHIPPED | OUTPATIENT
Start: 2024-06-05

## 2024-06-05 NOTE — PROGRESS NOTES
[facility] Note    Chief Complaint      Chief Complaint   Patient presents with    Annual Exam     History of Present Illness      Ricardo Vizcarra is a 49 y.o. 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    HPI: New Problem today. Pt complains of left eyelid edema and dermatitis .    ROS: discomfort     Physical Exam: skin irritation temporal corner of L eye, lower lid edema, conjunctiva nl appearing    A/P: tx as presumed stye with HC drops, warm compress, mild moisturizer to skom     Active Problem List with Overview Notes    Diagnosis Date Noted    Irregular periods 2024    Alpha thalassemia trait 2023       Health Maintenance   Topic Date Due    TETANUS VACCINE  Never done    Mammogram  2024    Colorectal Cancer Screening  2025    Lipid Panel  2028    Hepatitis C Screening  Completed       History reviewed. No pertinent past medical history.    Past Surgical History:   Procedure Laterality Date    BREAST CYST EXCISION Left      SECTION         family history includes  "Cancer in her mother; Hypertension in her father and mother; Ovarian cancer (age of onset: 66) in her mother.    Social History     Tobacco Use    Smoking status: Never    Smokeless tobacco: Never   Substance Use Topics    Alcohol use: Not Currently    Drug use: Never       Review of Systems   Constitutional:  Negative for chills, fever, malaise/fatigue and weight loss.   HENT:  Negative for hearing loss.    Eyes:  Negative for discharge.   Respiratory:  Negative for cough, sputum production, shortness of breath and wheezing.    Cardiovascular:  Negative for chest pain, palpitations, orthopnea and leg swelling.   Gastrointestinal:  Negative for blood in stool, constipation, diarrhea, nausea and vomiting.   Genitourinary:  Negative for dysuria, frequency, hematuria and urgency.   Musculoskeletal:  Negative for neck pain.   Neurological:  Negative for weakness and headaches.   Endo/Heme/Allergies:  Negative for polydipsia.        Outpatient Encounter Medications as of 6/5/2024   Medication Sig Dispense Refill    neomycin-bacitracin-polymyxin (POLYSPORIN) ophthalmic ointment Place into the left eye 4 (four) times daily. 3.5 g 0     No facility-administered encounter medications on file as of 6/5/2024.        Review of patient's allergies indicates:   Allergen Reactions    Latex, natural rubber Rash           Physical Exam      Vital Signs  Pulse: 89  SpO2: 99 %  BP: 116/70  BP Location: Left arm  Patient Position: Sitting  Height and Weight  Height: 5' 6" (167.6 cm)  Weight: 53.7 kg (118 lb 6.2 oz)  BSA (Calculated - sq m): 1.58 sq meters  BMI (Calculated): 19.1  Weight in (lb) to have BMI = 25: 154.6]    Physical Exam  Vitals reviewed.   Constitutional:       General: She is not in acute distress.     Appearance: She is well-developed. She is not diaphoretic.   HENT:      Head: Normocephalic and atraumatic.      Right Ear: External ear normal.      Left Ear: External ear normal.      Nose: Nose normal.   Eyes:      " "General:         Right eye: No discharge.         Left eye: No discharge.      Conjunctiva/sclera: Conjunctivae normal.      Comments: See HPI    Cardiovascular:      Rate and Rhythm: Normal rate and regular rhythm.      Heart sounds: Normal heart sounds.   Pulmonary:      Effort: Pulmonary effort is normal. No respiratory distress.      Breath sounds: Normal breath sounds.   Musculoskeletal:         General: Normal range of motion.      Cervical back: Normal range of motion.   Skin:     Coloration: Skin is not pale.      Findings: No rash.   Neurological:      Mental Status: She is alert and oriented to person, place, and time.   Psychiatric:         Behavior: Behavior normal.         Thought Content: Thought content normal.          Laboratory:  CBC:  No results for input(s): "WBC", "RBC", "HGB", "HCT", "PLT", "MCV", "MCH", "MCHC" in the last 2160 hours.  CMP:  No results for input(s): "GLU", "CALCIUM", "ALBUMIN", "PROT", "NA", "K", "CO2", "CL", "BUN", "ALKPHOS", "ALT", "AST", "BILITOT" in the last 2160 hours.    Invalid input(s): "CREATININ"  URINALYSIS:  No results for input(s): "COLORU", "CLARITYU", "SPECGRAV", "PHUR", "PROTEINUA", "GLUCOSEU", "BILIRUBINCON", "BLOODU", "WBCU", "RBCU", "BACTERIA", "MUCUS", "NITRITE", "LEUKOCYTESUR", "UROBILINOGEN", "HYALINECASTS" in the last 2160 hours.   LIPIDS:  No results for input(s): "TSH", "HDL", "CHOL", "TRIG", "LDLCALC", "CHOLHDL", "NONHDLCHOL", "TOTALCHOLEST" in the last 2160 hours.  TSH:  No results for input(s): "TSH" in the last 2160 hours.  A1C:  No results for input(s): "HGBA1C" in the last 2160 hours.    Radiology:      Assessment/Plan     Ricardo Vizcarra is a 49 y.o.female with:    1. Wellness examination  -     Lipid Panel; Future; Expected date: 06/05/2024  -     Comprehensive Metabolic Panel; Future; Expected date: 06/05/2024  -     CBC Without Differential; Future; Expected date: 06/05/2024    2. Irregular periods  Assessment & Plan:  Dicussed clinical menopause " and what constitutes AUB       3. Chalazion, unspecified laterality  -     neomycin-bacitracin-polymyxin (POLYSPORIN) ophthalmic ointment; Place into the left eye 4 (four) times daily.  Dispense: 3.5 g; Refill: 0    4. Dermatitis         Use of the MyChart Patient Portal discussed and encouraged during today's visit  -Continue current medications and maintain follow up with specialists.  Return to clinic in 1 year .  Future Appointments   Date Time Provider Department Center   7/10/2024  9:20 AM Julia Clayton MD [facility] DERM Cici Lind MD  6/5/2024 8:16 AM    [facility] Care Internal Medicine

## 2024-06-06 ENCOUNTER — LAB VISIT (OUTPATIENT)
Dept: LAB | Facility: HOSPITAL | Age: 49
End: 2024-06-06
Attending: INTERNAL MEDICINE
Payer: COMMERCIAL

## 2024-06-06 DIAGNOSIS — Z00.00 WELLNESS EXAMINATION: ICD-10-CM

## 2024-06-06 LAB
ALBUMIN SERPL BCP-MCNC: 3.9 G/DL (ref 3.5–5.2)
ALP SERPL-CCNC: 59 U/L (ref 55–135)
ALT SERPL W/O P-5'-P-CCNC: 11 U/L (ref 10–44)
ANION GAP SERPL CALC-SCNC: 10 MMOL/L (ref 8–16)
AST SERPL-CCNC: 13 U/L (ref 10–40)
BILIRUB SERPL-MCNC: 0.5 MG/DL (ref 0.1–1)
BUN SERPL-MCNC: 13 MG/DL (ref 6–20)
CALCIUM SERPL-MCNC: 9.1 MG/DL (ref 8.7–10.5)
CHLORIDE SERPL-SCNC: 109 MMOL/L (ref 95–110)
CHOLEST SERPL-MCNC: 167 MG/DL (ref 120–199)
CHOLEST/HDLC SERPL: 3 {RATIO} (ref 2–5)
CO2 SERPL-SCNC: 22 MMOL/L (ref 23–29)
CREAT SERPL-MCNC: 0.7 MG/DL (ref 0.5–1.4)
ERYTHROCYTE [DISTWIDTH] IN BLOOD BY AUTOMATED COUNT: 21.8 % (ref 11.5–14.5)
EST. GFR  (NO RACE VARIABLE): >60 ML/MIN/1.73 M^2
GLUCOSE SERPL-MCNC: 88 MG/DL (ref 70–110)
HCT VFR BLD AUTO: 39.6 % (ref 37–48.5)
HDLC SERPL-MCNC: 56 MG/DL (ref 40–75)
HDLC SERPL: 33.5 % (ref 20–50)
HGB BLD-MCNC: 11.2 G/DL (ref 12–16)
LDLC SERPL CALC-MCNC: 97.4 MG/DL (ref 63–159)
MCH RBC QN AUTO: 18.1 PG (ref 27–31)
MCHC RBC AUTO-ENTMCNC: 28.3 G/DL (ref 32–36)
MCV RBC AUTO: 64 FL (ref 82–98)
NONHDLC SERPL-MCNC: 111 MG/DL
PLATELET # BLD AUTO: 194 K/UL (ref 150–450)
PMV BLD AUTO: ABNORMAL FL (ref 9.2–12.9)
POTASSIUM SERPL-SCNC: 4.1 MMOL/L (ref 3.5–5.1)
PROT SERPL-MCNC: 6.7 G/DL (ref 6–8.4)
RBC # BLD AUTO: 6.19 M/UL (ref 4–5.4)
SODIUM SERPL-SCNC: 141 MMOL/L (ref 136–145)
TRIGL SERPL-MCNC: 68 MG/DL (ref 30–150)
WBC # BLD AUTO: 4.02 K/UL (ref 3.9–12.7)

## 2024-06-06 PROCEDURE — 85027 COMPLETE CBC AUTOMATED: CPT | Performed by: INTERNAL MEDICINE

## 2024-06-06 PROCEDURE — 36415 COLL VENOUS BLD VENIPUNCTURE: CPT | Performed by: INTERNAL MEDICINE

## 2024-06-06 PROCEDURE — 80061 LIPID PANEL: CPT | Performed by: INTERNAL MEDICINE

## 2024-06-06 PROCEDURE — 80053 COMPREHEN METABOLIC PANEL: CPT | Performed by: INTERNAL MEDICINE

## 2024-07-10 ENCOUNTER — OFFICE VISIT (OUTPATIENT)
Dept: DERMATOLOGY | Facility: CLINIC | Age: 49
End: 2024-07-10
Payer: COMMERCIAL

## 2024-07-10 VITALS — BODY MASS INDEX: 19.05 KG/M2 | WEIGHT: 118 LBS

## 2024-07-10 DIAGNOSIS — D48.5 NEOPLASM OF UNCERTAIN BEHAVIOR OF SKIN: Primary | ICD-10-CM

## 2024-07-10 PROCEDURE — 11102 TANGNTL BX SKIN SINGLE LES: CPT | Mod: S$GLB,,, | Performed by: DERMATOLOGY

## 2024-07-10 PROCEDURE — 1160F RVW MEDS BY RX/DR IN RCRD: CPT | Mod: CPTII,S$GLB,, | Performed by: DERMATOLOGY

## 2024-07-10 PROCEDURE — 99999 PR PBB SHADOW E&M-EST. PATIENT-LVL III: CPT | Mod: PBBFAC,,, | Performed by: DERMATOLOGY

## 2024-07-10 PROCEDURE — 88305 TISSUE EXAM BY PATHOLOGIST: CPT | Performed by: DERMATOLOGY

## 2024-07-10 PROCEDURE — 3008F BODY MASS INDEX DOCD: CPT | Mod: CPTII,S$GLB,, | Performed by: DERMATOLOGY

## 2024-07-10 PROCEDURE — 1159F MED LIST DOCD IN RCRD: CPT | Mod: CPTII,S$GLB,, | Performed by: DERMATOLOGY

## 2024-07-10 PROCEDURE — 99214 OFFICE O/P EST MOD 30 MIN: CPT | Mod: 25,S$GLB,, | Performed by: DERMATOLOGY

## 2024-07-10 NOTE — PROGRESS NOTES
Subjective:      Patient ID:  Ricardo Vizcarra is a 49 y.o. female who presents for   Chief Complaint   Patient presents with    Spot     Right side of the face, several months      See previous note Dr Coombs, spot on cheek which has darkened.     Spot - Initial  Affected locations: face  Duration: 6 months  Signs / symptoms: asymptomatic  Aggravated by: nothing      Review of Systems   Constitutional:  Negative for fever, chills, weight loss, weight gain, fatigue, night sweats and malaise.   Skin:  Negative for daily sunscreen use, activity-related sunscreen use and wears hat.   Hematologic/Lymphatic: Does not bruise/bleed easily.       Objective:   Physical Exam   Constitutional: She appears well-developed and well-nourished.   Neurological: She is alert and oriented to person, place, and time.   Psychiatric: She has a normal mood and affect.   Skin:   Areas Examined (abnormalities noted in diagram):   Head / Face Inspection Performed            Diagram Legend     Erythematous scaling macule/papule c/w actinic keratosis       Vascular papule c/w angioma      Pigmented verrucoid papule/plaque c/w seborrheic keratosis      Yellow umbilicated papule c/w sebaceous hyperplasia      Irregularly shaped tan macule c/w lentigo     1-2 mm smooth white papules consistent with Milia      Movable subcutaneous cyst with punctum c/w epidermal inclusion cyst      Subcutaneous movable cyst c/w pilar cyst      Firm pink to brown papule c/w dermatofibroma      Pedunculated fleshy papule(s) c/w skin tag(s)      Evenly pigmented macule c/w junctional nevus     Mildly variegated pigmented, slightly irregular-bordered macule c/w mildly atypical nevus      Flesh colored to evenly pigmented papule c/w intradermal nevus       Pink pearly papule/plaque c/w basal cell carcinoma      Erythematous hyperkeratotic cursted plaque c/w SCC      Surgical scar with no sign of skin cancer recurrence      Open and closed comedones      Inflammatory  papules and pustules      Verrucoid papule consistent consistent with wart     Erythematous eczematous patches and plaques     Dystrophic onycholytic nail with subungual debris c/w onychomycosis     Umbilicated papule    Erythematous-base heme-crusted tan verrucoid plaque consistent with inflamed seborrheic keratosis     Erythematous Silvery Scaling Plaque c/w Psoriasis     See annotation      Assessment / Plan:      Pathology Orders:       Normal Orders This Visit    Specimen to Pathology, Dermatology     Questions:    Procedure Type: Dermatology and skin neoplasms    Number of Specimens: 1    ------------------------: -------------------------    Spec 1 Procedure: Biopsy    Spec 1 Clinical Impression: irritated nevus    Spec 1 Source: right cheek    Release to patient:           Neoplasm of uncertain behavior of skin  -     Specimen to Pathology, Dermatology  Shave biopsy performed after verbal consent including risk of infection, scar, recurrence, need for additional treatment of site.  Explained will leave a scar at least same size as the lesion.  She elects to proceed. Area prepped with alcohol, anesthetized with 1% lidocaine with epinephrine. . Hemostasis achieved with cautery. No complications. Dressing applied. Wound care explained.                  Follow up in about 1 year (around 7/10/2025).

## 2024-07-11 LAB
FINAL PATHOLOGIC DIAGNOSIS: NORMAL
GROSS: NORMAL
Lab: NORMAL
MICROSCOPIC EXAM: NORMAL

## 2024-07-12 ENCOUNTER — PATIENT MESSAGE (OUTPATIENT)
Dept: DERMATOLOGY | Facility: CLINIC | Age: 49
End: 2024-07-12
Payer: COMMERCIAL

## 2025-02-11 ENCOUNTER — OFFICE VISIT (OUTPATIENT)
Dept: OBSTETRICS AND GYNECOLOGY | Facility: CLINIC | Age: 50
End: 2025-02-11
Payer: COMMERCIAL

## 2025-02-11 VITALS
SYSTOLIC BLOOD PRESSURE: 120 MMHG | BODY MASS INDEX: 20.07 KG/M2 | WEIGHT: 124.31 LBS | DIASTOLIC BLOOD PRESSURE: 72 MMHG

## 2025-02-11 DIAGNOSIS — Z01.419 ENCOUNTER FOR ANNUAL ROUTINE GYNECOLOGICAL EXAMINATION: Primary | ICD-10-CM

## 2025-02-11 DIAGNOSIS — N84.1 ENDOCERVICAL POLYP: ICD-10-CM

## 2025-02-11 DIAGNOSIS — Z80.41 FAMILY HISTORY OF OVARIAN CANCER: ICD-10-CM

## 2025-02-11 DIAGNOSIS — Z12.31 ENCOUNTER FOR SCREENING MAMMOGRAM FOR BREAST CANCER: ICD-10-CM

## 2025-02-11 PROCEDURE — 88305 TISSUE EXAM BY PATHOLOGIST: CPT | Performed by: PATHOLOGY

## 2025-02-11 PROCEDURE — 3074F SYST BP LT 130 MM HG: CPT | Mod: CPTII,S$GLB,, | Performed by: OBSTETRICS & GYNECOLOGY

## 2025-02-11 PROCEDURE — 3008F BODY MASS INDEX DOCD: CPT | Mod: CPTII,S$GLB,, | Performed by: OBSTETRICS & GYNECOLOGY

## 2025-02-11 PROCEDURE — 99999 PR PBB SHADOW E&M-EST. PATIENT-LVL III: CPT | Mod: PBBFAC,,, | Performed by: OBSTETRICS & GYNECOLOGY

## 2025-02-11 PROCEDURE — 88305 TISSUE EXAM BY PATHOLOGIST: CPT | Mod: 26,,, | Performed by: PATHOLOGY

## 2025-02-11 PROCEDURE — 57500 BIOPSY OF CERVIX: CPT | Mod: S$GLB,,, | Performed by: OBSTETRICS & GYNECOLOGY

## 2025-02-11 PROCEDURE — 99396 PREV VISIT EST AGE 40-64: CPT | Mod: 25,S$GLB,, | Performed by: OBSTETRICS & GYNECOLOGY

## 2025-02-11 PROCEDURE — 1160F RVW MEDS BY RX/DR IN RCRD: CPT | Mod: CPTII,S$GLB,, | Performed by: OBSTETRICS & GYNECOLOGY

## 2025-02-11 PROCEDURE — 3078F DIAST BP <80 MM HG: CPT | Mod: CPTII,S$GLB,, | Performed by: OBSTETRICS & GYNECOLOGY

## 2025-02-11 PROCEDURE — 1159F MED LIST DOCD IN RCRD: CPT | Mod: CPTII,S$GLB,, | Performed by: OBSTETRICS & GYNECOLOGY

## 2025-02-13 ENCOUNTER — TELEPHONE (OUTPATIENT)
Dept: HEMATOLOGY/ONCOLOGY | Facility: CLINIC | Age: 50
End: 2025-02-13
Payer: COMMERCIAL

## 2025-02-13 LAB
FINAL PATHOLOGIC DIAGNOSIS: NORMAL
GROSS: NORMAL
Lab: NORMAL

## 2025-02-13 NOTE — TELEPHONE ENCOUNTER
Called and spoke to patient to confirm the virtual visit on 2/18/25 and to complete the genetic questionnaire.

## 2025-02-18 ENCOUNTER — OFFICE VISIT (OUTPATIENT)
Dept: HEMATOLOGY/ONCOLOGY | Facility: CLINIC | Age: 50
End: 2025-02-18
Payer: COMMERCIAL

## 2025-02-18 DIAGNOSIS — Z71.83 ENCOUNTER FOR NONPROCREATIVE GENETIC COUNSELING: Primary | ICD-10-CM

## 2025-02-18 DIAGNOSIS — Z80.9 FAMILY HISTORY OF CANCER: ICD-10-CM

## 2025-02-18 DIAGNOSIS — Z80.41 FAMILY HISTORY OF OVARIAN CANCER: ICD-10-CM

## 2025-02-18 DIAGNOSIS — Z80.0 FAMILY HISTORY OF PANCREATIC CANCER: ICD-10-CM

## 2025-02-18 NOTE — PROGRESS NOTES
Cancer Genetics  Hereditary and High-Risk Clinic  Department of Hematology and Oncology  [facility]    Date of Service:  25  Visit Provider:  Nicholas Bearden [facility]    Patient ID  Name: Ricardo Vizcarra    : 1975    MRN: [id number]      Referring Provider  Tabitha Mckenzie MD  [address]  [address]  [place] [address]    Televisit Information  The patient location is: [place].    The chief complaint leading to consultation is:  As below.    Visit type: audiovisual.      Face-to-face time with patient:  Approximately 30 minutes.    Approximately 50 minutes in total were spent on this encounter, which includes face-to-face time and non-face-to-face time preparing to see the patient (e.g., review of records and tests), obtaining and/or reviewing separately obtained history, documenting clinical information in the electronic or other health record, independently interpreting results (not separately reported) and communicating results to the patient/family/caregiver, or care coordination (not separately reported).  Each patient to whom he or she provides medical services by telemedicine is:  (1) informed of the relationship between the physician and patient and the respective role of any other health care provider with respect to management of the patient; and (2) notified that he or she may decline to receive medical services by telemedicine and may withdraw from such care at any time.    IMPRESSION     Ms. Ricardo Vizcarra is a pleasant 49 y.o. female. We discussed her personal health history, family health history, and the purpose and logistics of genetic testing for hereditary cancer syndromes. She has a personal history of a breast cyst, uterine fibroids, and an endocervical polyp. Her maternal family history includes her mother who was diagnosed with ovarian cancer at age 66 and her aunt who was diagnosed with either stomach or colon cancer between the ages of 50-55.  "Her paternal family history includes one great uncle with a diagnosis of pancreatic cancer and one great uncle with a diagnosis of an unknown cancer. She also reported other paternal relatives with a history of cancer, but the specific relation and diagnoses were unknown so they were not added to the pedigree.     Ms. Silva meets NCCN criteria for genetic testing and elected to proceed with the xG CancerNext + RNAinsight panel offered by [facility].     FOCUSED PERSONAL HISTORY     Chief Complaint: Genetic Evaluation (Family hisotry of ovarian cancer)    History of Present Illness (HPI):  Ricardo Vizcarra ("Ricardo"), 49 y.o., assigned female sex at birth, is established with the [facility] but new to me.  She was referred by Obstetrics and Gynecology for hereditary cancer risk assessment given her family history of ovarian cancer.    Cancer History  No personal history of cancer   She has a history of a left breast cyst. I was not able to review the report.   She also has a history of uterine fibroids  Report from pelvic ultrasound on 7/5/2023:    Ms. Silva also reported a history of an endocervical polyp  Pathology report from 2/13/2025:    Waqar New lifetime risk of breast cancer: 8.2    Focused Medical History  Previous germline cancer genetic testing:  No  Colonoscopy: No - She had a Cologuard screening test performed on 4/28/022 that was negative  Mammogram: Yes  Most recent mammogram: 12/14/2023  Breast density: heterogeneously dense  Breast MRI:  No  Pancreatitis:  No    Gynecologic History  Age at menarche:  13 years old  Age at first live childbirth:  30 years old  Menopausal status:  premenopausal  Reproductive organs:  Intact    Hormone Use  No HRT reported    Tobacco Use  Tobacco Use: Low Risk  (2/11/2025)    Patient History     Smoking Tobacco Use: Never     Smokeless Tobacco Use: Never     Passive Exposure: Not on file     FAMILY HISTORY     Maternal ancestry: " Chinese  Paternal ancestry: Chinese   Ashkenazi Baptism ancestry: None reported    A family history of birth defects, intellectual disability, SIDS, sudden early death, multiple miscarriages and consanguinity were denied. Please refer to above pedigree for further details. A larger copy has been scanned in the Media tab.     DISCUSSION     Causes of Cancer:    Cancer occurs when cells grow out of control. Some genes help protect against cancer by controlling the growth of cells. However, mutations (problems) in these genes can prevent them from working properly, increasing the risk of developing cancer.  Sporadic Cancer: Most people who get cancer have sporadic cancer. Sporadic cancer is caused by mutations that occur during the lifetime. These mutations may be caused by aging, environmental exposures (ex. UV radiation, carcinogens), lifestyle (ex. smoking, drinking alcohol, sunbathing, poor diet), other medical conditions (ex. hepatitis, HPV, ulcerative colitis), or other factors.   Hereditary Cancer: A small percentage (5-10%) of people who develop cancer were born with a mutation already in one of the cancer protection genes.  Familial Cancer: Cancer can also cluster in families that do not have an identifiable mutation. This may be due to shared environmental or lifestyle factors or genetic risk factors that have not been identified or cannot be detected using current technology or panels.    Ricardo meets NCCN criteria for genetic testing based on her mother's diagnosis of ovarian cancer. Therefore, she was offered family history driven and broad panel testing. We discussed the advantages, disadvantages, and limitations of each test. With what information, Ricardo opted for the [facility] CancerNext + Cellmaxnsight panel through [facility]. The following 39 genes associated with brain, breast, colon, ovarian, pancreatic, prostate, renal, uterine, and other cancers are included on this panel: APC, KIKE, AXIN2, BAP1, BARD1,  BMPR1A, BRCA1, BRCA2, BRIP1, CDH1, CDKN2A, CHEK2, EPCAM, FH, FLCN , GREM1, HOXB13, MBD4, MET , MLH1, MSH2, MSH3, MSH6, MUTYH, NF1, NTHL1, PALB2, PMS2, POLD1, POLE, PTEN, RAD51C, RAD51D, SMAD4, STK11, TP53, TSC1, TSC2, VHL.    Most Informative Person to Test:  Genetic testing usually provides the most information when completed for the family member who is most likely to test positive. This would be someone who had a personal history of suspicious cancer(s) (based on type, age, or number). If this individual tests negative, it is very unlikely that others in the family would have a hereditary cancer risk (unless others also have a suspicious personal history). If this family member tests positive, then testing would be recommended for other relatives.     In Ricardo's family, the most appropriate individual to have genetic testing would have been her mother, but she is . We discussed that Ricardo could undergo genetic testing, with the limitation that a negative result will not provide as much information about her cancer risks.      If someone who does not have a personal history of suspicious cancer has genetic testing, a negative result is much more difficult to interpret (unless there is a known hereditary cancer predisposition in the family). his is called an uninformative negative result. There are several possibilities for a negative result in this situation:    The cancer in the family could be due to a hereditary cancer risk that this individual did not inherit. In this case, a negative result would likely reduce the risk of related cancers.  The cancer in the family may not be due to an identifiable hereditary cancer risk. The cancers in the family may be related to shared environmental or lifestyle factors or a genetic factor that cannot be identified by the test completed using current technology. As a result, the risk of cancer may still be increased based on the family history.    Furthermore,  we discussed the psychosocial implications of a positive result, including anxiety, fear and guilt if a mutation is passed on to a child. Ricardo did not express any concerns.    Possible Results:  Positive (pathogenic or likely pathogenic variant): A genetic variant was found that is suspected or known to impact the function of the gene. The impact of a positive result on an individual's risk of cancer varies based on the gene, specific variant, individual's sex assigned at birth, personal cancer history, other health history (such as surgical history), and family history. A positive result can impact screening and risk management recommendations. However, there are not always available guidelines for management based on a specific gene variant. Family history and personal risk factors should always be considered. Sometimes, a positive result can also have treatment or reproductive implications.   Negative: No clinically significant variants were reported in the tested genes. A negative result does not indicate that an individual cannot develop cancer or even that the individual is at average risk. An individual may still be at an increased risk for cancer based on personal risk factors or family history. Additionally, there could be a hereditary cancer predisposition that was not included in a chosen panel or is not detected with current technology.  Variant of Uncertain Significance (VUS): A variant was found. However, the lab does not have enough information to determine if the variant is benign (harmless) or pathogenic (impacts the function of the gene). The laboratory may update (reclassify) the variant over time as more information becomes available. When reclassified, most variants of uncertain significance are reclassified to benign/likely benign. Typically, it is not recommended to  based on the presence of a VUS. The chance of finding a VUS varies based on the test performed. Generally,  the chance of finding a VUS increases with the number of genes tested and decreases with the amount of testing of that gene (genes that are tested more frequently or for a longer period of time have a lower VUS rate).    Genetic Mutation Inheritance:  When an individual has a gene mutation, their first-degree relatives (parents, children, and siblings) each have a 50% chance of carrying the same mutation. Other, more distant blood relatives can also be at risk of carrying the same mutation. At-risk relatives of an individual with a mutation should consider genetic testing to help determine their risk for cancer.     Genetic Discrimination:  The Genetic Information Nondiscrimination Act of 2008 (DISHA) is a [place] federal law that provides some protections against the use of an individual's genetic information by their health insurer and by their employer. Title I of DISHA prohibits most health insurers (except for insurance obtained through a job with the  or the Federal Employees Health Benefits Plan) from utilizing an individual's genetic information to make decisions regarding insurance eligibility or premium charges. Title II of DISHA prohibits covered entities from requesting or requiring the genetic information of employees and applicants and from using said information to make employment decisions. This does not apply to employers with fewer than 15 employees or to the .  DISHA also does not protect individuals from genetic discrimination by any other type of policy or entity, including but not limited to life insurance, disability insurance, long-term care insurance,  benefits, and [facility] benefits.    There is also a possibility for the patient to incur out-of-pocket costs related to this testing. Tgemeliale appeared to have a good understanding of the information as she asked appropriate questions.  Ricardo received comprehensive counseling regarding panel testing and has  "elected to proceed with this testing. A sample was scheduled to be submitted on 2/19/2024 to [facility].  Ricardo's results should be available in approximately 3 weeks. In the meantime, she is welcome to contact me if she has any questions, concerns, or updates to her family history.     Breast Cancer Risk Stratification:  Current, Estimated Breast Cancer Risk Model Used Patient's Score Patient's Risk Category   5-year Sofia Model 1.1%  [] N/A given age <35   [x] Average risk (<1.7%)   [] Increased risk (>=1.7%)   10-year Tyrer-Cuzick v8.0b 2.08%  [x] <5%   [] >=5%    Lifetime (to age 85) Tyrer-Cuzick v8.0b 8.2%  [x] Average risk (<15%)   [] Intermediate risk (>=15% - <20%)   [] Increased risk (>=20%)      There are differences between these models and how her personal and family history affects these risks. With her 5-year risk of breast cancer being < 1.7%, risk-reducing agents do not have to be considered. Due to her <5% 10-year risk to develop breast cancer and < 20% lifetime risk to develop breast cancer she is not eligible for breast MRI. These risk stratifications and recommendations may change with changes in personal history, family history, and genetic testing results.    ASSESSMENT / PLAN      Ricardo Vizcarra ("Ricardo"), 49 y.o., presented today for hereditary cancer risk assessment and genetic counseling given her family history of ovarian cancer. Her maternal family history of ovarian cancer and stomach or colon cancer could be suspicious of a hereditary cancer syndrome called Silva Syndrome. Since she would be considered an uninformative negative, Silva Syndrome or any other hereditary cancer syndrome would not be ruled out if her genetic testing yields a negative result. She meets NCCN criteria for genetic testing and elected to proceed with the xG CancerNext + RNAinsight panel offered by [facility].       ICD-10-CM ICD-9-CM   1. Encounter for nonprocreative genetic counseling  Z71.83 V26.33   2. Family " history of ovarian cancer  Z80.41 V16.41   3. Family history of cancer  Z80.9 V16.9   4. Family history of pancreatic cancer  Z80.0 V16.0       Genetic Test Information  Genetics lab: [facility]   Genetic test panel: [facility] xG CancerNext +RNAinsight   Indication/ICD Codes:  80.41, Z80.9, Z80.0   Specimen type: Blood   Specimen collection by: [facility]    Specimen collection date: 2/19/25   Results expected by: Approximately 3 weeks after the genetic testing lab receives the specimen   Results disclosure plan: Post-test visit if positive or complex result; otherwise, results will be communicated through phone call   Verbal informed consent: Obtained     Follow-up:   I will call the patient with the results of her genetic test. If the result is positive, a follow up appointment will be scheduled to discuss the details of the finding.    Questions were encouraged and answered to the patient's satisfaction, and she verbalized understanding of the information and agreement with the plan.         Approximately 30 minutes were spent face-to-face with the patient.  Approximately 50 minutes in total were spent on this encounter, which includes face-to-face time and non-face-to-face time preparing to see the patient (e.g., review of tests), obtaining and/or reviewing separately obtained history, documenting clinical information in the electronic or other health record, independently interpreting results (not separately reported) and communicating results to the patient/family/caregiver, or care coordination (not separately reported).     This assessment is based on the history and reports provided, as well as the current scientific knowledge regarding cancer genetics.         Nicholas Bearden [facility]  Genetic Counselor, Hereditary and High-Risk Clinic  Department of Hematology and Oncology  [facility]        CC:  Dr. Tabitha Mckenzie

## 2025-02-19 ENCOUNTER — LAB VISIT (OUTPATIENT)
Dept: LAB | Facility: HOSPITAL | Age: 50
End: 2025-02-19
Payer: COMMERCIAL

## 2025-02-19 DIAGNOSIS — Z71.83 ENCOUNTER FOR NONPROCREATIVE GENETIC COUNSELING: ICD-10-CM

## 2025-02-19 PROCEDURE — 36415 COLL VENOUS BLD VENIPUNCTURE: CPT | Performed by: GENETIC COUNSELOR, MS

## 2025-03-03 ENCOUNTER — RESULTS FOLLOW-UP (OUTPATIENT)
Dept: OBSTETRICS AND GYNECOLOGY | Facility: CLINIC | Age: 50
End: 2025-03-03

## 2025-03-11 ENCOUNTER — TELEPHONE (OUTPATIENT)
Dept: HEMATOLOGY/ONCOLOGY | Facility: CLINIC | Age: 50
End: 2025-03-11
Payer: COMMERCIAL

## 2025-03-11 NOTE — TELEPHONE ENCOUNTER
Cancer Genetics  Hereditary and High-Risk Clinic  Department of Hematology and Oncology  [facility]    Patient ID  Name: Ricardo Vizcarra    : 1975    MRN: [id number]      IMPRESSION     Ricardo's panel genetic testing was negative for actionable mutations in 39 genes associated with hereditary breast, gastrointestinal, gynecologic, kidney, pancreatic, prostate and skin cancers. This leaves the family history of cancer unexplained, but no increased screening is warranted with negative results. Results were disclosed over the phone on 3/11/25.    DISCUSSION      GENETIC TEST RESULTS    Ricardo had a sample submitted on 25 to [facility] for xG with [facility] panel testing. This panel includes sequencing and/or deletion/duplication analysis of the following 39 genes:    APC, KIKE, AXIN2, BAP1, BARD1, BMPR1A, BRCA1, BRCA2, BRIP1, CDH1, CDKN2A, CHEK2, EPCAM, FH, FLCN, GREM1, HOXB13, MBD4, MET, MLH1, MSH2, MSH3, MSH6, MUTYH, NF1, NTHL1, PALB2, PMS2, POLD1, POLE, PTEN, RAD51C, RAD51D, SMAD4, STK11, TP53, TSC1, TSC2, VHL     The results were negative for actionable mutations in any of these genes. This is considered an uninformative negative result, and it does not rule out a hereditary form of cancer in the family. It is unknown if the affected relatives had a mutation that Ricardo did not inherit, or if they would have tested negative for mutations as well, because something other than the aforementioned genes caused their cancers. As these results have no impact on other individuals in the family, including her sister, they may still want to pursue their own genetic testing.        CANCER RISKS    Breast Cancer Risk Stratification   Current, Estimated Breast Cancer Risk Model Used Patient's Score Patient's Risk Category   5-year Sofia Model 0.7%  [] N/A given age <35   [x] Average risk (<1.7%)   [] Increased risk (>=1.7%)   10-year Tyrer-Natividadzick v8.0b 2.8%  [x] <5%   [] >=5%    Lifetime  (to age 85) RobertJesenia v8.0b 10.53%  [x] Average risk (<15%)   [] Intermediate risk (>=15% - <20%)   [] Increased risk (>=20%)      There are differences between these models and how her personal and family history affects these risks. Due to her <20% lifetime risk to develop breast cancer, she is not eligible for breast MRI for a period of time.      Ricardo is recommended to pursue the following cancer screenings:  Breast cancer: annual clinical breast exam, annual mammogram  Colon cancer:   Gynecologic cancer: annual pelvic exam, Pap smear per gynecologist  Skin cancer: annual skin exam    FAMILY MEMBERS    Ricardo's close female relatives may still be at increased risk of ovarian cancer given her mother's history. Her sister and other maternal relatives may also want to pursue genetic testing, as Ricardo's negative results do not impact her.     Ricardo received comprehensive genetic counseling regarding her negative genetic test results. Benefits and limitations were discussed, and she was provided with an electronic copy of her results report. Ricardo is encouraged to contact cancer genetics if there are any updates to her personal or family history, or if she has any questions or concerns.    This assessment is based on the history and reports provided, as well as the current scientific knowledge regarding cancer genetics.    Lori Vela, [facility], [facility]  Senior Genetic Counselor, Hereditary and High-Risk Clinic  Department of Hematology and Oncology  [facility]        CC:  Dr. Tabitha Mckenzie

## 2025-04-07 NOTE — PROGRESS NOTES
Chief Complaint: Well Woman Exam     HPI:      Ricardo Vizcarra is a 50 y.o.  who presents today for well woman exam.  LMP: Patient's last menstrual period was 2025.  No issues, problems, or complaints. Specifically, patient denies abnormal vaginal bleeding, discharge, pelvic pain, urinary problems, or changes in appetite. Ms. Vizcarra is currently sexually active with a single male partner. She declines STD screening today. Patient has regular monthly menses. Patient's last menstrual period was 2025. She is currently using no method for contraception. Menopausal complaints: cycles now about 40 days apart, asymptomatic otherwise    Previous Pap: no abnormalities/HPV negative  (2024)  Previous Mammogram: BiRads: 1 T-C Score: 10.28% Results for orders placed during the hospital encounter of 23    Mammo Digital Screening Bilat w/ Estuardo    Narrative  Result:  Mammo Digital Screening Bilat w/ Estuardo    History:  Patient is 48 y.o. and is seen for a screening mammogram.    Films Compared:  Compared to: 2021 Mammo Digital Screening Bilat w/ Estuardo and 2020 Mammo Digital Screening Bilat w/ Estuardo    Findings:  This procedure was performed using tomosynthesis. Computer-aided detection was utilized in the interpretation of this examination.  The breasts are heterogeneously dense, which may obscure small masses. There is no evidence of suspicious masses, calcifications, or other abnormal findings.    Impression  Bilateral  There is no mammographic evidence of malignancy.    BI-RADS Category:  Overall: 1 - Negative      Recommendation:  Routine screening mammogram in 1 year is recommended.      Your estimated lifetime risk of breast cancer (to age 85) based on Tyrer-Cuzick risk assessment model is Tyrer-Cuzick: 10.28 %. According to the American Cancer Society, patients with a lifetime breast cancer risk of 20% or higher might benefit from supplemental screening tests.     Colonoscopy: declined at this  time    Gardasil: Has never had     OB History          5    Para   3    Term   2       1    AB   2    Living   1         SAB   2    IAB        Ectopic        Multiple        Live Births   1           Obstetric Comments   Menarche at 13               GYN History  Age of Menarche:13  Age at first pregnancy:    Age at first live birth:30  Number of months breastfeeding:    Age at Menopause:    No abnormal pap or STDs      ROS:     GENERAL: Denies unintentional weight gain or weight loss. Feeling well overall.   SKIN: Denies rash or lesions.   HEENT: Denies headaches, or vision changes.   CARDIOVASCULAR: Denies palpitations or chest pain.   RESPIRATORY: Denies shortness of breath or dyspnea on exertion.  BREASTS: Denies pain, lumps, or nipple discharge.   ABDOMEN: Denies abdominal pain, constipation, diarrhea, nausea, vomiting, change in appetite.  URINARY: Denies frequency, dysuria, hematuria.  NEUROLOGIC: Denies syncope or weakness.   PSYCHIATRIC: Denies depression, anxiety or mood swings.    Physical Exam:      PHYSICAL EXAM:  /72   Wt 56.4 kg (124 lb 5.4 oz)   LMP 2025   BMI 20.07 kg/m²   Body mass index is 20.07 kg/m².     APPEARANCE: Well nourished, well developed, in no acute distress.  PSYCH: Appropriate mood and affect.  SKIN: No acne or hirsutism  NECK: Neck symmetric without masses or thyromegaly  NODES: No inguinal, axillary, or supraclavicular lymph node enlargement  ABDOMEN: Soft.  No tenderness or masses.    CARDIOVASCULAR: No edema of peripheral extremities  BREASTS: Symmetrical, no skin changes or visible lesions.  No palpable masses or nipple discharge bilaterally.  PELVIC: Normal external genitalia without lesions.  Normal hair distribution.  Adequate perineal body, normal urethral meatus.  Vagina moist and well rugated without lesions or discharge.  Cervix pink, without discharge or tenderness. +small 2-3 mm endocervical polyp with thin stalk at opening of external os.  Verbal consent obtained from patient to remove. Grasped with ring forcep and twisted with gentle traction. Removed and sent to pathology. Site made hemostatic with silver nitrate. Patient tolerated procedure well. No anesthesia required.  No significant cystocele or rectocele.  Bimanual exam shows uterus to be normal size, regular, mobile and nontender.  Adnexa without masses or tenderness.      Assessment/Plan:     Encounter for annual routine gynecological examination    Encounter for screening mammogram for breast cancer  -     Mammo Digital Screening Bilat w/ Estuardo; Future; Expected date: 02/11/2025    Family history of ovarian cancer  -     Ambulatory referral/consult to Genetics; Future; Expected date: 02/18/2025    Endocervical polyp  -     Specimen to Pathology, Ob/Gyn      RTC 1 year    Counseling:     Patient was counseled today on current ASCCP pap guidelines, the recommendation for yearly pelvic exams, healthy diet and exercise routines, breast self awareness and annual mammograms.She is to see her PCP for other health maintenance.     Use of the [employer] Patient Portal discussed and encouraged during today's visit.       Tabitha Mckenzie MD      As of April 1, 2021, the Cures Act has been passed nationally. This new law requires that all doctors progress notes, lab results, pathology reports and radiology reports be released IMMEDIATELY to the patient in the patient portal. That means that the results are released to you at the EXACT same time they are released to me. Therefore, with all of the patients that I have I am not able to reply to each patient exactly when the results come in. So there will be a delay from when you see the results to when I see them and have time to come up with a response to send you. Also I only see these results when I am on the computer at work. So if the results come in over the weekend or after 5 pm of a work day, I will not see them until the next business day. As you can  tell, this is a challenge as a physician to give every patient the quick response they hope for and deserve. So please be patient! Thanks for understanding, Dr. Mckenzie

## 2025-06-30 ENCOUNTER — OFFICE VISIT (OUTPATIENT)
Dept: PRIMARY CARE CLINIC | Facility: CLINIC | Age: 50
End: 2025-06-30
Payer: COMMERCIAL

## 2025-06-30 VITALS
BODY MASS INDEX: 19.56 KG/M2 | HEIGHT: 66 IN | WEIGHT: 121.69 LBS | HEART RATE: 72 BPM | DIASTOLIC BLOOD PRESSURE: 68 MMHG | OXYGEN SATURATION: 97 % | SYSTOLIC BLOOD PRESSURE: 126 MMHG

## 2025-06-30 DIAGNOSIS — Z13.6 ENCOUNTER FOR LIPID SCREENING FOR CARDIOVASCULAR DISEASE: ICD-10-CM

## 2025-06-30 DIAGNOSIS — Z13.220 ENCOUNTER FOR LIPID SCREENING FOR CARDIOVASCULAR DISEASE: ICD-10-CM

## 2025-06-30 DIAGNOSIS — Z13.1 SCREENING FOR DIABETES MELLITUS: ICD-10-CM

## 2025-06-30 DIAGNOSIS — Z00.00 ANNUAL PHYSICAL EXAM: Primary | ICD-10-CM

## 2025-06-30 DIAGNOSIS — D56.3 ALPHA THALASSEMIA TRAIT: ICD-10-CM

## 2025-06-30 DIAGNOSIS — N94.6 DYSMENORRHEA: ICD-10-CM

## 2025-06-30 DIAGNOSIS — N92.6 IRREGULAR PERIODS: ICD-10-CM

## 2025-06-30 PROCEDURE — 99396 PREV VISIT EST AGE 40-64: CPT | Mod: S$GLB,,, | Performed by: NURSE PRACTITIONER

## 2025-06-30 PROCEDURE — 1160F RVW MEDS BY RX/DR IN RCRD: CPT | Mod: CPTII,S$GLB,, | Performed by: NURSE PRACTITIONER

## 2025-06-30 PROCEDURE — 3074F SYST BP LT 130 MM HG: CPT | Mod: CPTII,S$GLB,, | Performed by: NURSE PRACTITIONER

## 2025-06-30 PROCEDURE — 3008F BODY MASS INDEX DOCD: CPT | Mod: CPTII,S$GLB,, | Performed by: NURSE PRACTITIONER

## 2025-06-30 PROCEDURE — 3078F DIAST BP <80 MM HG: CPT | Mod: CPTII,S$GLB,, | Performed by: NURSE PRACTITIONER

## 2025-06-30 PROCEDURE — 99999 PR PBB SHADOW E&M-EST. PATIENT-LVL III: CPT | Mod: PBBFAC,,, | Performed by: NURSE PRACTITIONER

## 2025-06-30 PROCEDURE — 1159F MED LIST DOCD IN RCRD: CPT | Mod: CPTII,S$GLB,, | Performed by: NURSE PRACTITIONER

## 2025-06-30 RX ORDER — NAPROXEN 500 MG/1
500 TABLET ORAL 2 TIMES DAILY WITH MEALS
Qty: 60 TABLET | Refills: 2 | Status: SHIPPED | OUTPATIENT
Start: 2025-06-30 | End: 2026-06-30

## 2025-06-30 NOTE — PROGRESS NOTES
[facility]    Chief Complaint      Chief Complaint   Patient presents with    Annual Exam    Establish Care     History of Present Illness      Ricardo Vizcarra is a 50 y.o. female patient with chronic conditions of  thalassemia who presents today for  annual.  Allergic to wine- rash     Labs ordered   Eye exam- glasses   Gyn- Magaly   Mammogram- ordered by gyn   Colonoscopy- will wait until next year     Vaccines:  Covid x 3    History of Present Illness    CHIEF COMPLAINT:  Ricardo presents today for follow up.    MENSTRUAL CHANGES:  She reports irregular menstrual cycles with significant variability in cycle length, ranging from 20 days to 2 months apart. She perceives these changes as potential indicators of approaching perimenopause.    FAMILY HISTORY:  Both parents have hypertension. She expresses concern about potential genetic predisposition to developing hypertension.    MEDICATIONS:  She requests refill of ibuprofen.    ALLERGIES:  She reports allergies to latex rubber and alcohol, which causes widespread rashes, particularly when consuming wine.      ROS:  Female Genitourinary: +abnormal menses, +intervals between periods is more than 35 days, +absent or irregular periods  Allergic: +urticaria/ hives, +allergic reactions         Health Maintenance   Topic Date Due    TETANUS VACCINE  Never done    Pneumococcal Vaccines (Age 0-49) (1 of 2 - PCV) Never done    COVID-19 Vaccine (4 - 2024-25 season) 09/01/2024    Mammogram  12/14/2024    Colorectal Cancer Screening  04/28/2025    Influenza Vaccine (Season Ended) 09/01/2025    Cervical Cancer Screening  01/10/2029    Lipid Panel  06/06/2029    RSV Vaccine (Age 60+ and Pregnant patients) (1 - 1-dose 75+ series) 02/27/2051    Hepatitis C Screening  Completed    HIV Screening  Completed       Past Medical History:   Diagnosis Date    BRCA1 gene mutation negative     BRCA2 gene mutation negative        Past Surgical History:   Procedure Laterality  "Date    BREAST CYST EXCISION Left      SECTION  2005       family history includes Cancer in her mother and another family member; Cancer (age of onset: 50 - 55) in her maternal aunt; Hypertension in her father and mother; Ovarian cancer (age of onset: 66) in her mother; Pancreatic cancer in an other family member.    Social History[1]    Encounter Medications[2]     Review of patient's allergies indicates:   Allergen Reactions    Latex, natural rubber Rash       Physical Exam      Vital Signs  Pulse: 72  SpO2: 97 %  BP: 126/68  BP Location: Right arm  Patient Position: Sitting  Height and Weight  Height: 5' 6" (167.6 cm)  Weight: 55.2 kg (121 lb 11.1 oz)  BSA (Calculated - sq m): 1.6 sq meters  BMI (Calculated): 19.7  Weight in (lb) to have BMI = 25: 154.6    Physical Exam  Vitals and nursing note reviewed.   Constitutional:       General: She is not in acute distress.     Appearance: Normal appearance. She is well-developed. She is not ill-appearing.   HENT:      Head: Normocephalic and atraumatic.      Right Ear: External ear normal.      Left Ear: External ear normal.   Eyes:      Conjunctiva/sclera: Conjunctivae normal.      Pupils: Pupils are equal, round, and reactive to light.   Neck:      Thyroid: No thyromegaly.      Vascular: No JVD.      Trachea: No tracheal deviation.   Cardiovascular:      Rate and Rhythm: Normal rate and regular rhythm.      Heart sounds: Normal heart sounds. No murmur heard.  Pulmonary:      Effort: Pulmonary effort is normal.      Breath sounds: Normal breath sounds.   Chest:      Chest wall: No tenderness.   Abdominal:      General: Bowel sounds are normal.      Palpations: Abdomen is soft.      Tenderness: There is no abdominal tenderness. There is no guarding.   Musculoskeletal:         General: Normal range of motion.      Cervical back: Normal range of motion and neck supple.   Lymphadenopathy:      Cervical: No cervical adenopathy.   Skin:     General: Skin is " warm and dry.   Neurological:      General: No focal deficit present.      Mental Status: She is alert and oriented to person, place, and time.   Psychiatric:         Mood and Affect: Mood normal.         Behavior: Behavior normal.         Thought Content: Thought content normal.         Judgment: Judgment normal.          Laboratory:  CBC:  Lab Results   Component Value Date    WBC 4.02 06/06/2024    RBC 6.19 (H) 06/06/2024    HGB 11.2 (L) 06/06/2024    HCT 39.6 06/06/2024     06/06/2024    MCV 64 (L) 06/06/2024    MCH 18.1 (L) 06/06/2024    MCHC 28.3 (L) 06/06/2024    MCHC 29.2 (L) 01/20/2023    MCHC 28.7 (L) 11/28/2022     CMP:  Lab Results   Component Value Date    GLU 88 06/06/2024    CALCIUM 9.1 06/06/2024    ALBUMIN 3.9 06/06/2024    PROT 6.7 06/06/2024     06/06/2024    K 4.1 06/06/2024    CO2 22 (L) 06/06/2024     06/06/2024    BUN 13 06/06/2024    ALKPHOS 59 06/06/2024    ALT 11 06/06/2024    AST 13 06/06/2024    BILITOT 0.5 06/06/2024    BILITOT 0.7 08/28/2023    BILITOT 0.7 04/22/2022     URINALYSIS:  Lab Results   Component Value Date    COLORU YELLOW 10/12/2009    SPECGRAV 1.007 10/12/2009    PHUR 7.5 10/12/2009    PROTEINUA 30 (A) 10/12/2009    BACTERIA Rare 10/12/2009    NITRITE Negative 10/12/2009    LEUKOCYTESUR 1+ (A) 10/12/2009    UROBILINOGEN 0.2 10/12/2009      LIPIDS:  Lab Results   Component Value Date    TSH 1.120 11/11/2020    TSH 0.727 01/12/2009    TSH 1.1 12/01/2007    HDL 56 06/06/2024    HDL 53 08/28/2023    HDL 57 04/22/2022    CHOL 167 06/06/2024    CHOL 159 08/28/2023    CHOL 158 04/22/2022    TRIG 68 06/06/2024    TRIG 63 08/28/2023    TRIG 74 04/22/2022    LDLCALC 97.4 06/06/2024    LDLCALC 93.4 08/28/2023    LDLCALC 86.2 04/22/2022    CHOLHDL 33.5 06/06/2024    CHOLHDL 33.3 08/28/2023    CHOLHDL 36.1 04/22/2022    NONHDLCHOL 111 06/06/2024    NONHDLCHOL 106 08/28/2023    NONHDLCHOL 101 04/22/2022    TOTALCHOLEST 3.0 06/06/2024    TOTALCHOLEST 3.0 08/28/2023     "TOTALCHOLEST 2.8 04/22/2022     TSH:  Lab Results   Component Value Date    TSH 1.120 11/11/2020    TSH 0.727 01/12/2009    TSH 1.1 12/01/2007     A1C:  No results found for: "HGBA1C"      Assessment/Plan     Ricardo Vizcarra is a 50 y.o.female with:    Assessment & Plan      ## IRREGULAR MENSTRUATION AND PERIMENOPAUSE:  - Ricardo reports irregular menstruation, occurring anywhere from every 20 days to almost 2 months apart, confirming her transition into perimenopause.  - Defined menopause as the absence of menstrual cycle for 1 year and suggested monitoring symptoms as she transitions through this phase.    ## ALLERGIES:  - Documented patient's allergies to latex (specifically rubber) and alcohol (specifically wine, which causes rashes and erythema).    ## CARDIOVASCULAR HEALTH AND HYPERTENSION PREVENTION:  - Reviewed blood pressure trends, which are consistent but aiming to maintain as low as possible given family history of hypertension in both parents.  - Advised on preventive lifestyle modifications including heart-healthy low-sodium diet, adequate hydration with water, stress management techniques, maintaining appropriate weight, and regular exercise.    ## GENERAL HEALTH MANAGEMENT:  - Educated patient on tetanus and potential sources of infection (e.g., luca nails, infected cuts).  - continuing swimming for exercise.  - Refilled Naproxen (prescription-strength ibuprofen)         Annual physical exam  -     CBC Auto Differential; Future; Expected date: 06/30/2025  -     Comprehensive Metabolic Panel; Future; Expected date: 06/30/2025  -     TSH; Future; Expected date: 06/30/2025  -     Lipid Panel; Future; Expected date: 06/30/2025  -     Hemoglobin A1C; Future; Expected date: 06/30/2025    Dysmenorrhea  -     naproxen (NAPROSYN) 500 MG tablet; Take 1 tablet (500 mg total) by mouth 2 (two) times daily with meals.  Dispense: 60 tablet; Refill: 2  -     CBC Auto Differential; Future; Expected date: 06/30/2025  -  "    Comprehensive Metabolic Panel; Future; Expected date: 06/30/2025  -     TSH; Future; Expected date: 06/30/2025  -     Lipid Panel; Future; Expected date: 06/30/2025  -     Hemoglobin A1C; Future; Expected date: 06/30/2025    Alpha thalassemia trait  -     CBC Auto Differential; Future; Expected date: 06/30/2025  -     Comprehensive Metabolic Panel; Future; Expected date: 06/30/2025  -     TSH; Future; Expected date: 06/30/2025  -     Lipid Panel; Future; Expected date: 06/30/2025  -     Hemoglobin A1C; Future; Expected date: 06/30/2025    Irregular periods  -     CBC Auto Differential; Future; Expected date: 06/30/2025  -     Comprehensive Metabolic Panel; Future; Expected date: 06/30/2025  -     TSH; Future; Expected date: 06/30/2025  -     Lipid Panel; Future; Expected date: 06/30/2025  -     Hemoglobin A1C; Future; Expected date: 06/30/2025    Encounter for lipid screening for cardiovascular disease  -     CBC Auto Differential; Future; Expected date: 06/30/2025  -     Comprehensive Metabolic Panel; Future; Expected date: 06/30/2025  -     TSH; Future; Expected date: 06/30/2025  -     Lipid Panel; Future; Expected date: 06/30/2025  -     Hemoglobin A1C; Future; Expected date: 06/30/2025    Screening for diabetes mellitus  -     Hemoglobin A1C; Future; Expected date: 06/30/2025          Health Maintenance Due   Topic Date Due    TETANUS VACCINE  Never done    Pneumococcal Vaccines (Age 0-49) (1 of 2 - PCV) Never done    COVID-19 Vaccine (4 - 2024-25 season) 09/01/2024    Mammogram  12/14/2024    Colorectal Cancer Screening  04/28/2025        I spent 35 minutes on the day of this encounter for preparing for, evaluating, treating, and managing this patient.      -Continue current medications and maintain follow up with specialists.  Return to clinic in Follow up in about 1 year (around 6/30/2026), or if symptoms worsen or fail to improve.     This note was generated with the assistance of ambient listening  technology. Verbal consent was obtained by the patient and accompanying visitor(s) for the recording of patient appointment to facilitate this note. I attest to having reviewed and edited the generated note for accuracy, though some syntax or spelling errors may persist. Please contact the author of this note for any clarification.        BOUBACAR Hassan  [facility] location                         [1]   Social History  Tobacco Use    Smoking status: Never    Smokeless tobacco: Never   Substance Use Topics    Alcohol use: Not Currently    Drug use: Never   [2]   Outpatient Encounter Medications as of 6/30/2025   Medication Sig Dispense Refill    naproxen (NAPROSYN) 500 MG tablet Take 1 tablet (500 mg total) by mouth 2 (two) times daily with meals. 60 tablet 2     No facility-administered encounter medications on file as of 6/30/2025.

## 2025-08-14 ENCOUNTER — LAB VISIT (OUTPATIENT)
Dept: LAB | Facility: HOSPITAL | Age: 50
End: 2025-08-14
Attending: NURSE PRACTITIONER
Payer: COMMERCIAL

## 2025-08-14 ENCOUNTER — PATIENT MESSAGE (OUTPATIENT)
Dept: PRIMARY CARE CLINIC | Facility: CLINIC | Age: 50
End: 2025-08-14
Payer: COMMERCIAL

## 2025-08-14 DIAGNOSIS — Z13.1 SCREENING FOR DIABETES MELLITUS: ICD-10-CM

## 2025-08-14 DIAGNOSIS — N92.6 IRREGULAR PERIODS: ICD-10-CM

## 2025-08-14 DIAGNOSIS — D56.3 ALPHA THALASSEMIA TRAIT: ICD-10-CM

## 2025-08-14 DIAGNOSIS — Z13.220 ENCOUNTER FOR LIPID SCREENING FOR CARDIOVASCULAR DISEASE: ICD-10-CM

## 2025-08-14 DIAGNOSIS — Z13.6 ENCOUNTER FOR LIPID SCREENING FOR CARDIOVASCULAR DISEASE: ICD-10-CM

## 2025-08-14 DIAGNOSIS — Z00.00 ANNUAL PHYSICAL EXAM: ICD-10-CM

## 2025-08-14 DIAGNOSIS — N94.6 DYSMENORRHEA: ICD-10-CM

## 2025-08-14 LAB
ABSOLUTE EOSINOPHIL (OHS): 0.07 K/UL
ABSOLUTE MONOCYTE (OHS): 0.3 K/UL (ref 0.3–1)
ABSOLUTE NEUTROPHIL COUNT (OHS): 1.75 K/UL (ref 1.8–7.7)
ALBUMIN SERPL BCP-MCNC: 4.4 G/DL (ref 3.5–5.2)
ALP SERPL-CCNC: 58 UNIT/L (ref 40–150)
ALT SERPL W/O P-5'-P-CCNC: 10 UNIT/L (ref 0–55)
ANION GAP (OHS): 7 MMOL/L (ref 8–16)
AST SERPL-CCNC: 17 UNIT/L (ref 0–50)
BASOPHILS # BLD AUTO: 0.01 K/UL
BASOPHILS NFR BLD AUTO: 0.3 %
BILIRUB SERPL-MCNC: 0.4 MG/DL (ref 0.1–1)
BUN SERPL-MCNC: 12 MG/DL (ref 6–20)
CALCIUM SERPL-MCNC: 8.7 MG/DL (ref 8.7–10.5)
CHLORIDE SERPL-SCNC: 108 MMOL/L (ref 95–110)
CHOLEST SERPL-MCNC: 156 MG/DL (ref 120–199)
CHOLEST/HDLC SERPL: 3.2 {RATIO} (ref 2–5)
CO2 SERPL-SCNC: 25 MMOL/L (ref 23–29)
CREAT SERPL-MCNC: 0.7 MG/DL (ref 0.5–1.4)
EAG (OHS): 120 MG/DL (ref 68–131)
ERYTHROCYTE [DISTWIDTH] IN BLOOD BY AUTOMATED COUNT: 22.5 % (ref 11.5–14.5)
GFR SERPLBLD CREATININE-BSD FMLA CKD-EPI: >60 ML/MIN/1.73/M2
GLUCOSE SERPL-MCNC: 92 MG/DL (ref 70–110)
HBA1C MFR BLD: 5.8 % (ref 4–5.6)
HCT VFR BLD AUTO: 35.8 % (ref 37–48.5)
HDLC SERPL-MCNC: 49 MG/DL (ref 40–75)
HDLC SERPL: 31.4 % (ref 20–50)
HGB BLD-MCNC: 10.1 GM/DL (ref 12–16)
IMM GRANULOCYTES # BLD AUTO: 0.01 K/UL (ref 0–0.04)
IMM GRANULOCYTES NFR BLD AUTO: 0.3 % (ref 0–0.5)
LDLC SERPL CALC-MCNC: 93.8 MG/DL (ref 63–159)
LYMPHOCYTES # BLD AUTO: 1.02 K/UL (ref 1–4.8)
MCH RBC QN AUTO: 16.8 PG (ref 27–31)
MCHC RBC AUTO-ENTMCNC: 28.2 G/DL (ref 32–36)
MCV RBC AUTO: 60 FL (ref 82–98)
NONHDLC SERPL-MCNC: 107 MG/DL
NUCLEATED RBC (/100WBC) (OHS): 0 /100 WBC
OVALOCYTES BLD QL SMEAR: NORMAL
PLATELET # BLD AUTO: 205 K/UL (ref 150–450)
PMV BLD AUTO: ABNORMAL FL
POTASSIUM SERPL-SCNC: 4.1 MMOL/L (ref 3.5–5.1)
PROT SERPL-MCNC: 7 GM/DL (ref 6–8.4)
RBC # BLD AUTO: 6 M/UL (ref 4–5.4)
RELATIVE EOSINOPHIL (OHS): 2.2 %
RELATIVE LYMPHOCYTE (OHS): 32.3 % (ref 18–48)
RELATIVE MONOCYTE (OHS): 9.5 % (ref 4–15)
RELATIVE NEUTROPHIL (OHS): 55.4 % (ref 38–73)
SODIUM SERPL-SCNC: 140 MMOL/L (ref 136–145)
SPHEROCYTES BLD QL SMEAR: NORMAL
TRIGL SERPL-MCNC: 66 MG/DL (ref 30–150)
TSH SERPL-ACNC: 1.5 UIU/ML (ref 0.4–4)
WBC # BLD AUTO: 3.16 K/UL (ref 3.9–12.7)

## 2025-08-14 PROCEDURE — 36415 COLL VENOUS BLD VENIPUNCTURE: CPT

## 2025-08-14 PROCEDURE — 84443 ASSAY THYROID STIM HORMONE: CPT

## 2025-08-14 PROCEDURE — 85025 COMPLETE CBC W/AUTO DIFF WBC: CPT

## 2025-08-14 PROCEDURE — 83036 HEMOGLOBIN GLYCOSYLATED A1C: CPT

## 2025-08-14 PROCEDURE — 82465 ASSAY BLD/SERUM CHOLESTEROL: CPT

## 2025-08-14 PROCEDURE — 84075 ASSAY ALKALINE PHOSPHATASE: CPT
